# Patient Record
Sex: MALE | Race: WHITE | NOT HISPANIC OR LATINO | Employment: FULL TIME | URBAN - METROPOLITAN AREA
[De-identification: names, ages, dates, MRNs, and addresses within clinical notes are randomized per-mention and may not be internally consistent; named-entity substitution may affect disease eponyms.]

---

## 2017-01-31 ENCOUNTER — OFFICE VISIT (OUTPATIENT)
Dept: SURGERY | Facility: CLINIC | Age: 37
End: 2017-01-31
Payer: COMMERCIAL

## 2017-01-31 VITALS
DIASTOLIC BLOOD PRESSURE: 99 MMHG | SYSTOLIC BLOOD PRESSURE: 139 MMHG | WEIGHT: 220 LBS | BODY MASS INDEX: 29.16 KG/M2 | HEIGHT: 73 IN | HEART RATE: 70 BPM

## 2017-01-31 DIAGNOSIS — K40.90 RIGHT INGUINAL HERNIA: Primary | ICD-10-CM

## 2017-01-31 PROCEDURE — 99243 OFF/OP CNSLTJ NEW/EST LOW 30: CPT | Performed by: SURGERY

## 2017-01-31 NOTE — MR AVS SNAPSHOT
"              After Visit Summary   1/31/2017    Volodymyr Valles    MRN: [id number]           Patient Information     Date Of Birth          1980        Visit Information        Provider Department      1/31/2017 9:15 AM Africa Whittington MD [facility]       Follow-ups after your visit        Who to contact     If you have questions or need follow up information about today's clinic visit or your schedule please contact [facility] directly at [phone].  Normal or non-critical lab and imaging results will be communicated to you by [employer], letter or phone within 4 business days after the clinic has received the results. If you do not hear from us within 7 days, please contact the clinic through [employer] or phone. If you have a critical or abnormal lab result, we will notify you by phone as soon as possible.  Submit refill requests through [employer] or call your pharmacy and they will forward the refill request to us. Please allow 3 business days for your refill to be completed.          Additional Information About Your Visit        MyChart Information     [employer] gives you secure access to your electronic health record. If you see a primary care provider, you can also send messages to your care team and make appointments. If you have questions, please call your primary care clinic.  If you do not have a primary care provider, please call [phone] and they will assist you.        Care EveryWhere ID     This is your Care EveryWhere ID. This could be used by other organizations to access your [facility] medical records  [id number]        Your Vitals Were     Pulse Height BMI (Body Mass Index)             70 6' 1\" (1.854 m) 29.03 kg/m2          Blood Pressure from Last 3 Encounters:   01/31/17 139/99   11/11/16 122/86   06/30/15 130/75    Weight from Last 3 Encounters:   01/31/17 220 lb (99.791 kg)   11/11/16 234 lb (106.142 " kg)   06/30/15 236 lb 6.4 oz (107.23 kg)              Today, you had the following     No orders found for display       Primary Care Provider Office Phone # Fax #    Wu Clay [phone]       [facility] [address] [facility] [address]        Thank you!     Thank you for choosing [facility]  for your care. Our goal is always to provide you with excellent care. Hearing back from our patients is one way we can continue to improve our services. Please take a few minutes to complete the written survey that you may receive in the mail after your visit with us. Thank you!             Your Updated Medication List - Protect others around you: Learn how to safely use, store and throw away your medicines at www.disposemymeds.org.          This list is accurate as of: 1/31/17  9:35 AM.  Always use your most recent med list.                   Brand Name Dispense Instructions for use    acetaminophen 325 MG tablet    TYLENOL    100 tablet    Take 2 tablets (650 mg) by mouth every 6 hours as needed for mild pain or fever       Multi-vitamin Tabs tablet      Take 1 tablet by mouth daily

## 2017-01-31 NOTE — PROGRESS NOTES
General Surgery Clinic Consultation      HPI:  Volodymyr is a 36 year old male who presents for consultation referred by Dr. Clay who presents for evaluation of right groin bulge.  He first noticed it a few months ago.  He denies pain but occasionally has aching in the right groin. He has always been able to reduce the hernia when lying flat. He does report that occasionally it is more difficult to reduce. .  Negative for associated symptoms of nausea, vomiting, diarrhea and constipation.  He has not had a previous herniorrhaphy in this location. He had a prior left inguinal hernia repair 10-15 years ago. He has history of epidermoid tumor excision with craniotomy 2 years ago.     Constipation: No  Colonoscopy: No   Dysuria: No  Cough: No  Diabetes: No  Current smoker: Yes - e cigarrette  Employment does not require heavy lifting.    Past Medical History:   has a past medical history of WPW syndrome and Epidermoid cyst.    Past Surgical History:  Past Surgical History   Procedure Laterality Date     C ablation supravent arrhythmogenic focus  1993      repair sliding inguinal hernia  2003     left     Craniotomy, retrosigmoid  4/7/2014     Procedure: CRANIOTOMY, RETROSIGMOID;  Left Retrosigmoid Approach, Resection Of Tumor, Ventriculostomy, Left  hip Fat [place] ;  Surgeon: Shalom Kebede MD;  Location: [facility] OR     Ventriculostomy  4/7/2014     Procedure: VENTRICULOSTOMY;;  Surgeon: Shalom Kebede MD;  Location: [facility] OR     Procure graft fat  4/7/2014     Procedure: PROCURE GRAFT FAT;;  Surgeon: Shalom Kebede MD;  Location: [facility] OR      Additional abdominal operations: left inguinal hernia repair at [facility]    Social History:  Social History     Social History     Marital Status:      Spouse Name: N/A     Number of Children: 0     Years of Education: N/A     Occupational History     [employer]           Social History Main Topics     Smoking status: Former Smoker -- 0.30  packs/day     Types: Cigarettes     Quit date: 10/01/2012     Smokeless tobacco: Never Used      Comment: E-Cig     Alcohol Use: 6.0 - 7.5 oz/week     12-15 Cans of beer per week      Comment: Occasionally     Drug Use: No     Sexual Activity:     Partners: Female     Other Topics Concern     Not on file     Social History Narrative    , expecting twins in February 2014, works at [employer] in [employer] ([employer])        Family History:  Family History   Problem Relation Age of Onset     DIABETES Paternal Grandfather      Hernias: Yes - father had three hernia surgeries    ROS:  The 10 point review of systems is negative other than noted in the HPI and above.    PE:    General- Well-developed, well-nourished, patient able to get up on table without difficulty.  HEENT- Normocephalic and atraumatic. Pupils equal and round.  Mucous membranes moist.  Sclera are nonicteric.  Neck- No lymphadenopathy or masses   Respirations- are regular and non labored  Abdomen- abdomen is soft without significant tenderness, masses, organomegaly or guarding, small umbilical hernia present  Hernia- Left inguinal hernia is not present, well healed scar left groin              Moderate sized right inguinal hernia is present spontaneously, The hernia is spontaneously reducible              Testes are descended bilaterally and normal    Assessment: Primary, reducible right inguinal hernia.    Plan:    We have discussed observation, reduction techniques and importance, incarceration and strangulation signs, symptoms and importance as well as need to seek emergency treatment.      We have had a detailed discussion regarding the nature of hernias, surgery, indications, alternatives, risks, benefits, incisions, scarring, anesthesia, recovery, mesh, infection, bleeding, numbness, nerve damage and chronic pain, testicular loss, hernia recurrence, lifting and activity limitations after surgery.  All questions have been  answered to the best of my ability.     We will schedule surgery at the patient's convenience. We will plan for laparoscopic right inguinal hernia repair with mesh and open umbilical hernia repair.     Time spent with the patient with greater that 50% of the time in discussion was 40 minutes.     Africa Whittington MD    Please route or send letter to:  Primary Care Provider (PCP) and Referring Provider          HPI      ROS      Physical Exam

## 2017-01-31 NOTE — Clinical Note
2017    General Surgery Clinic Consultation    RE:  Volodymyr Valles-:  [birth year]    HPI:  Volodymyr is a 36 year old male who presents for consultation referred by Dr. Clay who presents for evaluation of right groin bulge.  He first noticed it a few months ago.  He denies pain but occasionally has aching in the right groin. He has always been able to reduce the hernia when lying flat. He does report that occasionally it is more difficult to reduce. .  Negative for associated symptoms of nausea, vomiting, diarrhea and constipation.  He has not had a previous herniorrhaphy in this location. He had a prior left inguinal hernia repair 10-15 years ago. He has history of epidermoid tumor excision with craniotomy 2 years ago.     Constipation: No  Colonoscopy: No   Dysuria: No  Cough: No  Diabetes: No  Current smoker: Yes - e cigarrette  Employment does not require heavy lifting.    Past Medical History:  Has a past medical history of WPW syndrome and Epidermoid cyst.    Hernias: Yes - father had three hernia surgeries    ROS:  The 10 point review of systems is negative other than noted in the HPI and above.    PE:    General- Well-developed, well-nourished, patient able to get up on table without difficulty.  HEENT- Normocephalic and atraumatic. Pupils equal and round.  Mucous membranes moist.  Sclera are nonicteric.  Neck- No lymphadenopathy or masses   Respirations- are regular and non labored  Abdomen- abdomen is soft without significant tenderness, masses, organomegaly or guarding, small umbilical hernia present  Hernia- Left inguinal hernia is not present, well healed scar left groin              Moderate sized right inguinal hernia is present spontaneously, The hernia is spontaneously reducible              Testes are descended bilaterally and normal    Assessment: Primary, reducible right inguinal hernia.    Plan:    We have discussed observation, reduction techniques and importance, incarceration  and strangulation signs, symptoms and importance as well as need to seek emergency treatment.      We have had a detailed discussion regarding the nature of hernias, surgery, indications, alternatives, risks, benefits, incisions, scarring, anesthesia, recovery, mesh, infection, bleeding, numbness, nerve damage and chronic pain, testicular loss, hernia recurrence, lifting and activity limitations after surgery.  All questions have been answered to the best of my ability.     We will schedule surgery at the patient's convenience. We will plan for laparoscopic right inguinal hernia repair with mesh and open umbilical hernia repair.         Africa Whittington MD

## 2017-03-07 ENCOUNTER — OFFICE VISIT (OUTPATIENT)
Dept: INTERNAL MEDICINE | Facility: CLINIC | Age: 37
End: 2017-03-07
Payer: COMMERCIAL

## 2017-03-07 VITALS
OXYGEN SATURATION: 100 % | DIASTOLIC BLOOD PRESSURE: 92 MMHG | BODY MASS INDEX: 30.51 KG/M2 | SYSTOLIC BLOOD PRESSURE: 142 MMHG | TEMPERATURE: 98.3 F | HEIGHT: 73 IN | HEART RATE: 81 BPM | WEIGHT: 230.2 LBS

## 2017-03-07 DIAGNOSIS — K40.90 RIGHT INGUINAL HERNIA: ICD-10-CM

## 2017-03-07 DIAGNOSIS — Z86.79 HISTORY OF WOLFF-PARKINSON-WHITE (WPW) SYNDROME: ICD-10-CM

## 2017-03-07 DIAGNOSIS — I45.6 WPW SYNDROME: ICD-10-CM

## 2017-03-07 DIAGNOSIS — Z01.818 PREOP GENERAL PHYSICAL EXAM: Primary | ICD-10-CM

## 2017-03-07 LAB — HGB BLD-MCNC: 15.6 G/DL (ref 13.3–17.7)

## 2017-03-07 PROCEDURE — 36415 COLL VENOUS BLD VENIPUNCTURE: CPT | Performed by: PHYSICIAN ASSISTANT

## 2017-03-07 PROCEDURE — 85018 HEMOGLOBIN: CPT | Performed by: PHYSICIAN ASSISTANT

## 2017-03-07 PROCEDURE — 99214 OFFICE O/P EST MOD 30 MIN: CPT | Performed by: PHYSICIAN ASSISTANT

## 2017-03-07 PROCEDURE — 93000 ELECTROCARDIOGRAM COMPLETE: CPT | Performed by: PHYSICIAN ASSISTANT

## 2017-03-07 NOTE — MR AVS SNAPSHOT
After Visit Summary   3/7/2017    Volodymyr Valles    MRN: [id number]           Patient Information     Date Of Birth          1980        Visit Information        Provider Department      3/7/2017 3:40 PM Lo Ramos PA-C [facility]        Today's Diagnoses     Preop general physical exam    -  1    Right inguinal hernia        History of Jeanette-Parkinson-White (WPW) syndrome        WPW syndrome          Care Instructions      Before Your Surgery      Call your surgeon if there is any change in your health. This includes signs of a cold or flu (such as a sore throat, runny nose, cough, rash or fever).    Do not smoke, drink alcohol or take over the counter medicine (unless your surgeon or primary care doctor tells you to) for the 24 hours before and after surgery.    If you take prescribed drugs: Follow your doctor s orders about which medicines to take and which to stop until after surgery.    Eating and drinking prior to surgery: follow the instructions from your surgeon    Take a shower or bath the night before surgery. Use the soap your surgeon gave you to gently clean your skin. If you do not have soap from your surgeon, use your regular soap. Do not shave or scrub the surgery site.  Wear clean pajamas and have clean sheets on your bed.         Follow-ups after your visit        Your next 10 appointments already scheduled     Mar 13, 2017  9:00 AM CDT   [facility] Same Day Surgery with Africa Whittington MD   [facility]    [facility])   [phone]            Mar 13, 2017   Procedure with Africa Whittington MD   [facility] (--)    [address]  [facility] [address] [phone]              Who to contact     If you have questions or need follow up information about today's clinic visit or your  "schedule please contact [facility] directly at [phone].  Normal or non-critical lab and imaging results will be communicated to you by MyChart, letter or phone within 4 business days after the clinic has received the results. If you do not hear from us within 7 days, please contact the clinic through [employer] or phone. If you have a critical or abnormal lab result, we will notify you by phone as soon as possible.  Submit refill requests through [employer] or call your pharmacy and they will forward the refill request to us. Please allow 3 business days for your refill to be completed.          Additional Information About Your Visit        [employer] Information     [employer] gives you secure access to your electronic health record. If you see a primary care provider, you can also send messages to your care team and make appointments. If you have questions, please call your primary care clinic.  If you do not have a primary care provider, please call [phone] and they will assist you.        Care EveryWhere ID     This is your Care EveryWhere ID. This could be used by other organizations to access your [facility] medical records  [id number]        Your Vitals Were     Pulse Temperature Height Pulse Oximetry BMI (Body Mass Index)       81 98.3  F (36.8  C) (Oral) 6' 1\" (1.854 m) 100% 30.37 kg/m2        Blood Pressure from Last 3 Encounters:   03/07/17 (!) 142/92   01/31/17 (!) 139/99   11/11/16 122/86    Weight from Last 3 Encounters:   03/07/17 230 lb 3.2 oz (104.4 kg)   01/31/17 220 lb (99.8 kg)   11/11/16 234 lb (106.1 kg)              We Performed the Following     EKG 12-lead complete w/read - Clinics     Hemoglobin        Primary Care Provider Office Phone # Fax #    Wu Clay [phone]       [facility] [address] [facility] [address]        Thank you!     Thank you for choosing [facility]  for your care. Our goal " is always to provide you with excellent care. Hearing back from our patients is one way we can continue to improve our services. Please take a few minutes to complete the written survey that you may receive in the mail after your visit with us. Thank you!             Your Updated Medication List - Protect others around you: Learn how to safely use, store and throw away your medicines at www.disposemymeds.org.          This list is accurate as of: 3/7/17  4:30 PM.  Always use your most recent med list.                   Brand Name Dispense Instructions for use    acetaminophen 325 MG tablet    TYLENOL    100 tablet    Take 2 tablets (650 mg) by mouth every 6 hours as needed for mild pain or fever       Multi-vitamin Tabs tablet      Take 1 tablet by mouth daily

## 2017-03-07 NOTE — NURSING NOTE
"Chief Complaint   Patient presents with     Pre-Op Exam     03/13-hernia        Initial BP (!) 142/92 (BP Location: Left arm, Patient Position: Chair, Cuff Size: Adult Regular)  Pulse 81  Temp 98.3  F (36.8  C) (Oral)  Ht 6' 1\" (1.854 m)  Wt 230 lb 3.2 oz (104.4 kg)  SpO2 100%  BMI 30.37 kg/m2 Estimated body mass index is 30.37 kg/(m^2) as calculated from the following:    Height as of this encounter: 6' 1\" (1.854 m).    Weight as of this encounter: 230 lb 3.2 oz (104.4 kg).  Medication Reconciliation: complete    "

## 2017-03-07 NOTE — PROGRESS NOTES
[address] [address]  [phone]  Dept: [phone]    PRE-OP EVALUATION:  Today's date: 3/7/2017    Volodymyr Valles (: 1980) presents for pre-operative evaluation assessment as requested by Dr. Whittington.  He requires evaluation and anesthesia risk assessment prior to undergoing surgery/procedure for treatment of inguinal hernia  .  Proposed procedure: LAPAROCOPIC RIGHT INGUINAL HERNIA POSSIBLE OPEN INGUINAL HERNIA REPAIR WITH MESH; OPEN UMBILLICAL HERNIA REPAIR WITH MESH .     Date of Surgery/ Procedure:   Time of Surgery/ Procedure: 7am  Hospital/Surgical [facility]    Primary Physician: Wu Clay  Type of Anesthesia Anticipated: general    Patient has a Health Care Directive or Living Will:  NO    1. NO - Do you have a history of heart attack, stroke, stent, bypass or surgery on an artery in the head, neck, heart or legs?  2. NO - Do you ever have any pain or discomfort in your chest?  3. NO - Do you have a history of  Heart Failure?  4. NO - Are you troubled by shortness of breath when: walking on the level, up a slight hill or at night?  5. NO - Do you currently have a cold, bronchitis or other respiratory infection?  6. NO - Do you have a cough, shortness of breath or wheezing?  7. NO - Do you sometimes get pains in the calves of your legs when you walk?  8. NO - Do you or anyone in your family have previous history of blood clots?  9. NO - Do you or does anyone in your family have a serious bleeding problem such as prolonged bleeding following surgeries or cuts?  10. NO - Have you ever had problems with anemia or been told to take iron pills?  11. NO - Have you had any abnormal blood loss such as black, tarry or bloody stools, or abnormal vaginal bleeding?  12. NO - Have you ever had a blood transfusion?  13. NO - Have you or any of your relatives ever had problems with anesthesia?  14. NO - Do you  have sleep apnea, excessive snoring or daytime drowsiness?  15. NO - Do you have any prosthetic heart valves?  16. NO - Do you have prosthetic joints?  17. NO - Is there any chance that you may be pregnant?      HPI:                                                      Brief HPI related to upcoming procedure: right inguinal hernia, and umbilical hernia       See problem list for active medical problems.  Problems all longstanding and stable, except as noted/documented.  See ROS for pertinent symptoms related to these conditions.                                                                                                  .    MEDICAL HISTORY:                                                      Patient Active Problem List    Diagnosis Date Noted     History of Jeanette-Parkinson-White (WPW) syndrome 03/07/2017     Priority: Medium     Epidermoid cyst 04/07/2014     WPW syndrome      recurrent SVT- s/p ablation       Arachnoid cyst      CARDIOVASCULAR SCREENING; LDL GOAL LESS THAN 160 10/31/2010      Past Medical History   Diagnosis Date     Epidermoid cyst      brain     WPW syndrome      recurrent SVT- s/p ablation     Past Surgical History   Procedure Laterality Date     C ablation supravent arrhythmogenic focus  1993     Hc repair sliding inguinal hernia  2003     left     Craniotomy, retrosigmoid  4/7/2014     Procedure: CRANIOTOMY, RETROSIGMOID;  Left Retrosigmoid Approach, Resection Of Tumor, Ventriculostomy, Left  hip Fat [place] ;  Surgeon: Shalom Kebede MD;  Location: [facility] OR     Ventriculostomy  4/7/2014     Procedure: VENTRICULOSTOMY;;  Surgeon: Shalom Kebede MD;  Location: [facility] OR     Procure graft fat  4/7/2014     Procedure: PROCURE GRAFT FAT;;  Surgeon: Shalom Kebede MD;  Location: [facility] OR     Current Outpatient Prescriptions   Medication Sig Dispense Refill     acetaminophen (TYLENOL) 325 MG tablet Take 2 tablets (650 mg) by mouth every 6 hours as needed for  "mild pain or fever 100 tablet      multivitamin, therapeutic with minerals (MULTI-VITAMIN) TABS Take 1 tablet by mouth daily       OTC products: no recent use of OTC ASA, NSAIDS or Steroids    No Known Allergies   Latex Allergy: NO    Social History   Substance Use Topics     Smoking status: Former Smoker     Packs/day: 0.30     Types: Cigarettes     Quit date: 10/1/2012     Smokeless tobacco: Never Used      Comment: E-Cig     Alcohol use 6.0 - 7.5 oz/week     12 - 15 Cans of beer per week      Comment: Occasionally     History   Drug Use No       REVIEW OF SYSTEMS:                                                    C: NEGATIVE for fever, chills, change in weight  INTEGUMENTARY/SKIN: NEGATIVE for worrisome rashes, moles or lesions  EYES: NEGATIVE for vision changes or irritation  E/M: NEGATIVE for ear, mouth and throat problems  R: NEGATIVE for significant cough or SOB  CV: NEGATIVE for chest pain, palpitations or peripheral edema  GI: NEGATIVE for nausea, abdominal pain, heartburn, or change in bowel habits  MUSCULOSKELETAL: NEGATIVE for significant arthralgias or myalgia  NEURO: NEGATIVE for weakness, dizziness or paresthesias  ENDOCRINE: NEGATIVE for temperature intolerance, skin/hair changes  HEME/ALLERGY/IMMUNE: NEGATIVE for bleeding problems  PSYCHIATRIC: NEGATIVE for changes in mood or affect  ROS otherwise negative    EXAM:                                                    BP (!) 142/92 (BP Location: Left arm, Patient Position: Chair, Cuff Size: Adult Regular)  Pulse 81  Temp 98.3  F (36.8  C) (Oral)  Ht 6' 1\" (1.854 m)  Wt 230 lb 3.2 oz (104.4 kg)  SpO2 100%  BMI 30.37 kg/m2  GENERAL APPEARANCE: healthy, alert and no distress  EYES: Eyes grossly normal to inspection, PERRL and conjunctivae and sclerae normal  HENT: ear canals and TM's normal and nose and mouth without ulcers or lesions  RESP: lungs clear to auscultation - no rales, rhonchi or wheezes  CV: regular rate and rhythm, normal S1 S2, no S3 or S4 " and no murmur, click or rub   ABDOMEN: soft, nontender, no HSM or masses and bowel sounds normal  MS: extremities normal- no gross deformities noted  SKIN: no suspicious lesions or rashes  NEURO: Normal strength and tone, sensory exam grossly normal, mentation intact and speech normal  PSYCH: mentation appears normal and affect normal/bright    DIAGNOSTICS:                                                      EKG: appears normal, NSR, normal axis, normal intervals, no acute ST/T changes c/w ischemia, no LVH by voltage criteria, unchanged from previous tracings  Labs Resulted Today:   Results for orders placed or performed in visit on 03/07/17   Hemoglobin   Result Value Ref Range    Hemoglobin 15.6 13.3 - 17.7 g/dL       Recent Labs   Lab Test  04/11/14   0435  04/10/14   0450  04/09/14   0343   04/08/14   0431   HGB  14.1  13.2*  13.1*   --   13.5   PLT  191  157  160   --   166   INR   --    --   1.08   --   1.09   NA  141  143  143   < >  145*   POTASSIUM  4.1  4.0  3.5   < >  3.6   CR  0.86  0.81  0.87   --   0.86    < > = values in this interval not displayed.        IMPRESSION:                                                    Reason for surgery/procedure: inguinal and umbilical hernia  Diagnosis/reason for consult: hx of WPW, epidermoid cyst arachnoid cyst- hx of surgery     The proposed surgical procedure is considered INTERMEDIATE risk.    REVISED CARDIAC RISK INDEX  The patient has the following serious cardiovascular risks for perioperative complications such as (MI, PE, VFib and 3  AV Block):  No serious cardiac risks  INTERPRETATION: 0 risks: Class I (very low risk - 0.4% complication rate)    The patient has the following additional risks for perioperative complications:  No identified additional risks      ICD-10-CM    1. Right inguinal hernia K40.90 EKG 12-lead complete w/read - Clinics   2. History of Jeanette-Parkinson-White (WPW) syndrome Z86.79 EKG 12-lead complete w/read - Clinics   3. WPW  syndrome I45.6 EKG 12-lead complete w/read - Clinics       RECOMMENDATIONS:                                                              APPROVAL GIVEN to proceed with proposed procedure, without further diagnostic evaluation       Signed Electronically by: Lo Ramos PA-C    Copy of this evaluation report is provided to requesting physician.    [place] Preop Guidelines

## 2017-03-08 NOTE — H&P (VIEW-ONLY)
[address] [address]  [phone]  Dept: [phone]    PRE-OP EVALUATION:  Today's date: 3/7/2017    Volodymyr Valles (: 1980) presents for pre-operative evaluation assessment as requested by Dr. Whittington.  He requires evaluation and anesthesia risk assessment prior to undergoing surgery/procedure for treatment of inguinal hernia  .  Proposed procedure: LAPAROCOPIC RIGHT INGUINAL HERNIA POSSIBLE OPEN INGUINAL HERNIA REPAIR WITH MESH; OPEN UMBILLICAL HERNIA REPAIR WITH MESH .     Date of Surgery/ Procedure:   Time of Surgery/ Procedure: 7am  Hospital/Surgical Facility: [facility]    Primary Physician: uW Clay  Type of Anesthesia Anticipated: general    Patient has a Health Care Directive or Living Will:  NO    1. NO - Do you have a history of heart attack, stroke, stent, bypass or surgery on an artery in the head, neck, heart or legs?  2. NO - Do you ever have any pain or discomfort in your chest?  3. NO - Do you have a history of  Heart Failure?  4. NO - Are you troubled by shortness of breath when: walking on the level, up a slight hill or at night?  5. NO - Do you currently have a cold, bronchitis or other respiratory infection?  6. NO - Do you have a cough, shortness of breath or wheezing?  7. NO - Do you sometimes get pains in the calves of your legs when you walk?  8. NO - Do you or anyone in your family have previous history of blood clots?  9. NO - Do you or does anyone in your family have a serious bleeding problem such as prolonged bleeding following surgeries or cuts?  10. NO - Have you ever had problems with anemia or been told to take iron pills?  11. NO - Have you had any abnormal blood loss such as black, tarry or bloody stools, or abnormal vaginal bleeding?  12. NO - Have you ever had a blood transfusion?  13. NO - Have you or any of your relatives ever had problems with anesthesia?  14. NO - Do you  have sleep apnea, excessive snoring or daytime drowsiness?  15. NO - Do you have any prosthetic heart valves?  16. NO - Do you have prosthetic joints?  17. NO - Is there any chance that you may be pregnant?      HPI:                                                      Brief HPI related to upcoming procedure: right inguinal hernia, and umbilical hernia       See problem list for active medical problems.  Problems all longstanding and stable, except as noted/documented.  See ROS for pertinent symptoms related to these conditions.                                                                                                  .    MEDICAL HISTORY:                                                      Patient Active Problem List    Diagnosis Date Noted     History of Jeanette-Parkinson-White (WPW) syndrome 03/07/2017     Priority: Medium     Epidermoid cyst 04/07/2014     WPW syndrome      recurrent SVT- s/p ablation       Arachnoid cyst      CARDIOVASCULAR SCREENING; LDL GOAL LESS THAN 160 10/31/2010      Past Medical History   Diagnosis Date     Epidermoid cyst      brain     WPW syndrome      recurrent SVT- s/p ablation     Past Surgical History   Procedure Laterality Date     C ablation supravent arrhythmogenic focus  1993     Hc repair sliding inguinal hernia  2003     left     Craniotomy, retrosigmoid  4/7/2014     Procedure: CRANIOTOMY, RETROSIGMOID;  Left Retrosigmoid Approach, Resection Of Tumor, Ventriculostomy, Left  hip Fat [place] ;  Surgeon: Shalom Kebede MD;  Location: [facility] OR     Ventriculostomy  4/7/2014     Procedure: VENTRICULOSTOMY;;  Surgeon: Shalom Kebede MD;  Location: [facility] OR     Procure graft fat  4/7/2014     Procedure: PROCURE GRAFT FAT;;  Surgeon: Shalom Kebede MD;  Location: [facility] OR     Current Outpatient Prescriptions   Medication Sig Dispense Refill     acetaminophen (TYLENOL) 325 MG tablet Take 2 tablets (650 mg) by mouth every 6 hours as needed for  "mild pain or fever 100 tablet      multivitamin, therapeutic with minerals (MULTI-VITAMIN) TABS Take 1 tablet by mouth daily       OTC products: no recent use of OTC ASA, NSAIDS or Steroids    No Known Allergies   Latex Allergy: NO    Social History   Substance Use Topics     Smoking status: Former Smoker     Packs/day: 0.30     Types: Cigarettes     Quit date: 10/1/2012     Smokeless tobacco: Never Used      Comment: E-Cig     Alcohol use 6.0 - 7.5 oz/week     12 - 15 Cans of beer per week      Comment: Occasionally     History   Drug Use No       REVIEW OF SYSTEMS:                                                    C: NEGATIVE for fever, chills, change in weight  INTEGUMENTARY/SKIN: NEGATIVE for worrisome rashes, moles or lesions  EYES: NEGATIVE for vision changes or irritation  E/M: NEGATIVE for ear, mouth and throat problems  R: NEGATIVE for significant cough or SOB  CV: NEGATIVE for chest pain, palpitations or peripheral edema  GI: NEGATIVE for nausea, abdominal pain, heartburn, or change in bowel habits  MUSCULOSKELETAL: NEGATIVE for significant arthralgias or myalgia  NEURO: NEGATIVE for weakness, dizziness or paresthesias  ENDOCRINE: NEGATIVE for temperature intolerance, skin/hair changes  HEME/ALLERGY/IMMUNE: NEGATIVE for bleeding problems  PSYCHIATRIC: NEGATIVE for changes in mood or affect  ROS otherwise negative    EXAM:                                                    BP (!) 142/92 (BP Location: Left arm, Patient Position: Chair, Cuff Size: Adult Regular)  Pulse 81  Temp 98.3  F (36.8  C) (Oral)  Ht 6' 1\" (1.854 m)  Wt 230 lb 3.2 oz (104.4 kg)  SpO2 100%  BMI 30.37 kg/m2  GENERAL APPEARANCE: healthy, alert and no distress  EYES: Eyes grossly normal to inspection, PERRL and conjunctivae and sclerae normal  HENT: ear canals and TM's normal and nose and mouth without ulcers or lesions  RESP: lungs clear to auscultation - no rales, rhonchi or wheezes  CV: regular rate and rhythm, normal S1 S2, no S3 or S4 " and no murmur, click or rub   ABDOMEN: soft, nontender, no HSM or masses and bowel sounds normal  MS: extremities normal- no gross deformities noted  SKIN: no suspicious lesions or rashes  NEURO: Normal strength and tone, sensory exam grossly normal, mentation intact and speech normal  PSYCH: mentation appears normal and affect normal/bright    DIAGNOSTICS:                                                      EKG: appears normal, NSR, normal axis, normal intervals, no acute ST/T changes c/w ischemia, no LVH by voltage criteria, unchanged from previous tracings  Labs Resulted Today:   Results for orders placed or performed in visit on 03/07/17   Hemoglobin   Result Value Ref Range    Hemoglobin 15.6 13.3 - 17.7 g/dL       Recent Labs   Lab Test  04/11/14   0435  04/10/14   0450  04/09/14   0343   04/08/14   0431   HGB  14.1  13.2*  13.1*   --   13.5   PLT  191  157  160   --   166   INR   --    --   1.08   --   1.09   NA  141  143  143   < >  145*   POTASSIUM  4.1  4.0  3.5   < >  3.6   CR  0.86  0.81  0.87   --   0.86    < > = values in this interval not displayed.        IMPRESSION:                                                    Reason for surgery/procedure: inguinal and umbilical hernia  Diagnosis/reason for consult: hx of WPW, epidermoid cyst arachnoid cyst- hx of surgery     The proposed surgical procedure is considered INTERMEDIATE risk.    REVISED CARDIAC RISK INDEX  The patient has the following serious cardiovascular risks for perioperative complications such as (MI, PE, VFib and 3  AV Block):  No serious cardiac risks  INTERPRETATION: 0 risks: Class I (very low risk - 0.4% complication rate)    The patient has the following additional risks for perioperative complications:  No identified additional risks      ICD-10-CM    1. Right inguinal hernia K40.90 EKG 12-lead complete w/read - Clinics   2. History of Jeanette-Parkinson-White (WPW) syndrome Z86.79 EKG 12-lead complete w/read - Clinics   3. WPW  syndrome I45.6 EKG 12-lead complete w/read - Clinics       RECOMMENDATIONS:                                                              APPROVAL GIVEN to proceed with proposed procedure, without further diagnostic evaluation       Signed Electronically by: Lo Ramos PA-C    Copy of this evaluation report is provided to requesting physician.    [place] Preop Guidelines

## 2017-03-13 ENCOUNTER — APPOINTMENT (OUTPATIENT)
Dept: SURGERY | Facility: PHYSICIAN GROUP | Age: 37
End: 2017-03-13
Payer: COMMERCIAL

## 2017-03-13 ENCOUNTER — SURGERY (OUTPATIENT)
Age: 37
End: 2017-03-13

## 2017-03-13 ENCOUNTER — HOSPITAL ENCOUNTER (OUTPATIENT)
Facility: CLINIC | Age: 37
Discharge: HOME | End: 2017-03-13
Attending: SURGERY | Admitting: SURGERY
Payer: COMMERCIAL

## 2017-03-13 ENCOUNTER — ANESTHESIA EVENT (OUTPATIENT)
Dept: SURGERY | Facility: CLINIC | Age: 37
End: 2017-03-13
Payer: COMMERCIAL

## 2017-03-13 ENCOUNTER — ANESTHESIA (OUTPATIENT)
Dept: SURGERY | Facility: CLINIC | Age: 37
End: 2017-03-13
Payer: COMMERCIAL

## 2017-03-13 VITALS
BODY MASS INDEX: 29.69 KG/M2 | DIASTOLIC BLOOD PRESSURE: 85 MMHG | TEMPERATURE: 97.9 F | HEART RATE: 85 BPM | RESPIRATION RATE: 18 BRPM | SYSTOLIC BLOOD PRESSURE: 125 MMHG | WEIGHT: 225 LBS | OXYGEN SATURATION: 98 %

## 2017-03-13 DIAGNOSIS — G89.18 ACUTE POST-OPERATIVE PAIN: Primary | ICD-10-CM

## 2017-03-13 PROCEDURE — 27210995 ZZH RX 272: Performed by: SURGERY

## 2017-03-13 PROCEDURE — 37000009 ZZH ANESTHESIA TECHNICAL FEE, EACH ADDTL 15 MIN: Performed by: SURGERY

## 2017-03-13 PROCEDURE — 49585 ZZHC REPAIR UMBILICAL HERN,5+Y/O,REDUC: CPT | Mod: AS | Performed by: PHYSICIAN ASSISTANT

## 2017-03-13 PROCEDURE — 25000132 ZZH RX MED GY IP 250 OP 250 PS 637: Performed by: PHYSICIAN ASSISTANT

## 2017-03-13 PROCEDURE — 49585 ZZHC REPAIR UMBILICAL HERN,5+Y/O,REDUC: CPT | Performed by: SURGERY

## 2017-03-13 PROCEDURE — 71000027 ZZH RECOVERY PHASE 2 EACH 15 MINS: Performed by: SURGERY

## 2017-03-13 PROCEDURE — 25000125 ZZHC RX 250: Performed by: SURGERY

## 2017-03-13 PROCEDURE — 25000566 ZZH SEVOFLURANE, EA 15 MIN: Performed by: SURGERY

## 2017-03-13 PROCEDURE — 49650 LAP ING HERNIA REPAIR INIT: CPT | Performed by: SURGERY

## 2017-03-13 PROCEDURE — 25000128 H RX IP 250 OP 636

## 2017-03-13 PROCEDURE — 36000056 ZZH SURGERY LEVEL 3 1ST 30 MIN: Performed by: SURGERY

## 2017-03-13 PROCEDURE — 25800025 ZZH RX 258

## 2017-03-13 PROCEDURE — C1781 MESH (IMPLANTABLE): HCPCS | Performed by: SURGERY

## 2017-03-13 PROCEDURE — 25000128 H RX IP 250 OP 636: Performed by: SURGERY

## 2017-03-13 PROCEDURE — 25000125 ZZHC RX 250

## 2017-03-13 PROCEDURE — 71000012 ZZH RECOVERY PHASE 1 LEVEL 1 FIRST HR: Performed by: SURGERY

## 2017-03-13 PROCEDURE — 71000013 ZZH RECOVERY PHASE 1 LEVEL 1 EA ADDTL HR: Performed by: SURGERY

## 2017-03-13 PROCEDURE — 37000008 ZZH ANESTHESIA TECHNICAL FEE, 1ST 30 MIN: Performed by: SURGERY

## 2017-03-13 PROCEDURE — S0020 INJECTION, BUPIVICAINE HYDRO: HCPCS | Performed by: SURGERY

## 2017-03-13 PROCEDURE — 36000058 ZZH SURGERY LEVEL 3 EA 15 ADDTL MIN: Performed by: SURGERY

## 2017-03-13 PROCEDURE — 49650 LAP ING HERNIA REPAIR INIT: CPT | Mod: AS | Performed by: PHYSICIAN ASSISTANT

## 2017-03-13 PROCEDURE — 27210794 ZZH OR GENERAL SUPPLY STERILE: Performed by: SURGERY

## 2017-03-13 PROCEDURE — 25000125 ZZHC RX 250: Performed by: ANESTHESIOLOGY

## 2017-03-13 PROCEDURE — 40000170 ZZH STATISTIC PRE-PROCEDURE ASSESSMENT II: Performed by: SURGERY

## 2017-03-13 DEVICE — MESH PROGRIP LAPAROSCOPIC 5.9X3.9" PARIETEX SELF-FIX LPG1510: Type: IMPLANTABLE DEVICE | Site: INGUINAL | Status: FUNCTIONAL

## 2017-03-13 DEVICE — MESH COMPOSITE W/COLLAGEN 4CM OPEN VENTRAL PARIETEX PCO4VP: Type: IMPLANTABLE DEVICE | Site: UMBILICAL | Status: FUNCTIONAL

## 2017-03-13 RX ORDER — CEFAZOLIN SODIUM 2 G/100ML
2 INJECTION, SOLUTION INTRAVENOUS
Status: COMPLETED | OUTPATIENT
Start: 2017-03-13 | End: 2017-03-13

## 2017-03-13 RX ORDER — OXYCODONE AND ACETAMINOPHEN 5; 325 MG/1; MG/1
1-2 TABLET ORAL
Status: COMPLETED | OUTPATIENT
Start: 2017-03-13 | End: 2017-03-13

## 2017-03-13 RX ORDER — MAGNESIUM HYDROXIDE 1200 MG/15ML
LIQUID ORAL PRN
Status: DISCONTINUED | OUTPATIENT
Start: 2017-03-13 | End: 2017-03-13 | Stop reason: HOSPADM

## 2017-03-13 RX ORDER — FENTANYL CITRATE 50 UG/ML
INJECTION, SOLUTION INTRAMUSCULAR; INTRAVENOUS PRN
Status: DISCONTINUED | OUTPATIENT
Start: 2017-03-13 | End: 2017-03-13

## 2017-03-13 RX ORDER — PROPOFOL 10 MG/ML
INJECTION, EMULSION INTRAVENOUS PRN
Status: DISCONTINUED | OUTPATIENT
Start: 2017-03-13 | End: 2017-03-13

## 2017-03-13 RX ORDER — ONDANSETRON 4 MG/1
4 TABLET, ORALLY DISINTEGRATING ORAL EVERY 30 MIN PRN
Status: DISCONTINUED | OUTPATIENT
Start: 2017-03-13 | End: 2017-03-13 | Stop reason: HOSPADM

## 2017-03-13 RX ORDER — NEOSTIGMINE METHYLSULFATE 1 MG/ML
VIAL (ML) INJECTION PRN
Status: DISCONTINUED | OUTPATIENT
Start: 2017-03-13 | End: 2017-03-13

## 2017-03-13 RX ORDER — NALOXONE HYDROCHLORIDE 0.4 MG/ML
.1-.4 INJECTION, SOLUTION INTRAMUSCULAR; INTRAVENOUS; SUBCUTANEOUS
Status: DISCONTINUED | OUTPATIENT
Start: 2017-03-13 | End: 2017-03-13 | Stop reason: HOSPADM

## 2017-03-13 RX ORDER — FENTANYL CITRATE 50 UG/ML
25-50 INJECTION, SOLUTION INTRAMUSCULAR; INTRAVENOUS
Status: DISCONTINUED | OUTPATIENT
Start: 2017-03-13 | End: 2017-03-13 | Stop reason: HOSPADM

## 2017-03-13 RX ORDER — MEPERIDINE HYDROCHLORIDE 25 MG/ML
12.5 INJECTION INTRAMUSCULAR; INTRAVENOUS; SUBCUTANEOUS
Status: DISCONTINUED | OUTPATIENT
Start: 2017-03-13 | End: 2017-03-13 | Stop reason: HOSPADM

## 2017-03-13 RX ORDER — SODIUM CHLORIDE, SODIUM LACTATE, POTASSIUM CHLORIDE, CALCIUM CHLORIDE 600; 310; 30; 20 MG/100ML; MG/100ML; MG/100ML; MG/100ML
INJECTION, SOLUTION INTRAVENOUS CONTINUOUS PRN
Status: DISCONTINUED | OUTPATIENT
Start: 2017-03-13 | End: 2017-03-13

## 2017-03-13 RX ORDER — OXYCODONE AND ACETAMINOPHEN 5; 325 MG/1; MG/1
1-2 TABLET ORAL EVERY 4 HOURS PRN
Qty: 30 TABLET | Refills: 0 | Status: SHIPPED | OUTPATIENT
Start: 2017-03-13 | End: 2018-01-30

## 2017-03-13 RX ORDER — BUPIVACAINE HYDROCHLORIDE 5 MG/ML
INJECTION, SOLUTION PERINEURAL PRN
Status: DISCONTINUED | OUTPATIENT
Start: 2017-03-13 | End: 2017-03-13 | Stop reason: HOSPADM

## 2017-03-13 RX ORDER — BUPIVACAINE HYDROCHLORIDE 5 MG/ML
INJECTION, SOLUTION EPIDURAL; INTRACAUDAL
Status: DISCONTINUED
Start: 2017-03-13 | End: 2017-03-13 | Stop reason: HOSPADM

## 2017-03-13 RX ORDER — AMOXICILLIN 250 MG
1-2 CAPSULE ORAL 2 TIMES DAILY
Qty: 30 TABLET | Refills: 0 | Status: SHIPPED | OUTPATIENT
Start: 2017-03-13 | End: 2018-01-30

## 2017-03-13 RX ORDER — CEFAZOLIN SODIUM 1 G/3ML
1 INJECTION, POWDER, FOR SOLUTION INTRAMUSCULAR; INTRAVENOUS SEE ADMIN INSTRUCTIONS
Status: DISCONTINUED | OUTPATIENT
Start: 2017-03-13 | End: 2017-03-13 | Stop reason: HOSPADM

## 2017-03-13 RX ORDER — ONDANSETRON 2 MG/ML
INJECTION INTRAMUSCULAR; INTRAVENOUS PRN
Status: DISCONTINUED | OUTPATIENT
Start: 2017-03-13 | End: 2017-03-13

## 2017-03-13 RX ORDER — SODIUM CHLORIDE, SODIUM LACTATE, POTASSIUM CHLORIDE, CALCIUM CHLORIDE 600; 310; 30; 20 MG/100ML; MG/100ML; MG/100ML; MG/100ML
INJECTION, SOLUTION INTRAVENOUS CONTINUOUS
Status: DISCONTINUED | OUTPATIENT
Start: 2017-03-13 | End: 2017-03-13 | Stop reason: HOSPADM

## 2017-03-13 RX ORDER — BUPIVACAINE HYDROCHLORIDE AND EPINEPHRINE 5; 5 MG/ML; UG/ML
INJECTION, SOLUTION EPIDURAL; INTRACAUDAL; PERINEURAL
Status: DISCONTINUED
Start: 2017-03-13 | End: 2017-03-13 | Stop reason: HOSPADM

## 2017-03-13 RX ORDER — DEXAMETHASONE SODIUM PHOSPHATE 4 MG/ML
INJECTION, SOLUTION INTRA-ARTICULAR; INTRALESIONAL; INTRAMUSCULAR; INTRAVENOUS; SOFT TISSUE PRN
Status: DISCONTINUED | OUTPATIENT
Start: 2017-03-13 | End: 2017-03-13

## 2017-03-13 RX ORDER — ONDANSETRON 2 MG/ML
4 INJECTION INTRAMUSCULAR; INTRAVENOUS EVERY 30 MIN PRN
Status: DISCONTINUED | OUTPATIENT
Start: 2017-03-13 | End: 2017-03-13 | Stop reason: HOSPADM

## 2017-03-13 RX ORDER — GLYCOPYRROLATE 0.2 MG/ML
INJECTION, SOLUTION INTRAMUSCULAR; INTRAVENOUS PRN
Status: DISCONTINUED | OUTPATIENT
Start: 2017-03-13 | End: 2017-03-13

## 2017-03-13 RX ORDER — LIDOCAINE HYDROCHLORIDE 20 MG/ML
INJECTION, SOLUTION INFILTRATION; PERINEURAL PRN
Status: DISCONTINUED | OUTPATIENT
Start: 2017-03-13 | End: 2017-03-13

## 2017-03-13 RX ORDER — VECURONIUM BROMIDE 1 MG/ML
INJECTION, POWDER, LYOPHILIZED, FOR SOLUTION INTRAVENOUS PRN
Status: DISCONTINUED | OUTPATIENT
Start: 2017-03-13 | End: 2017-03-13

## 2017-03-13 RX ADMIN — CEFAZOLIN SODIUM 2 G: 2 INJECTION, SOLUTION INTRAVENOUS at 09:18

## 2017-03-13 RX ADMIN — ROCURONIUM BROMIDE 40 MG: 10 INJECTION INTRAVENOUS at 09:11

## 2017-03-13 RX ADMIN — GLYCOPYRROLATE 0.8 MG: 0.2 INJECTION, SOLUTION INTRAMUSCULAR; INTRAVENOUS at 10:38

## 2017-03-13 RX ADMIN — OXYCODONE HYDROCHLORIDE AND ACETAMINOPHEN 1 TABLET: 5; 325 TABLET ORAL at 11:50

## 2017-03-13 RX ADMIN — NEOSTIGMINE METHYLSULFATE 5 MG: 1 INJECTION INTRAMUSCULAR; INTRAVENOUS; SUBCUTANEOUS at 10:38

## 2017-03-13 RX ADMIN — FENTANYL CITRATE 100 MCG: 50 INJECTION, SOLUTION INTRAMUSCULAR; INTRAVENOUS at 09:13

## 2017-03-13 RX ADMIN — SODIUM CHLORIDE, POTASSIUM CHLORIDE, SODIUM LACTATE AND CALCIUM CHLORIDE: 600; 310; 30; 20 INJECTION, SOLUTION INTRAVENOUS at 10:06

## 2017-03-13 RX ADMIN — BUPIVACAINE HYDROCHLORIDE 30 ML: 5 INJECTION, SOLUTION PERINEURAL at 10:35

## 2017-03-13 RX ADMIN — VECURONIUM BROMIDE 1 MG: 1 INJECTION, POWDER, LYOPHILIZED, FOR SOLUTION INTRAVENOUS at 10:21

## 2017-03-13 RX ADMIN — VECURONIUM BROMIDE 2 MG: 1 INJECTION, POWDER, LYOPHILIZED, FOR SOLUTION INTRAVENOUS at 09:49

## 2017-03-13 RX ADMIN — LIDOCAINE HYDROCHLORIDE 100 MG: 20 INJECTION, SOLUTION INFILTRATION; PERINEURAL at 09:11

## 2017-03-13 RX ADMIN — SODIUM CHLORIDE 1000 ML: 0.9 IRRIGANT IRRIGATION at 10:35

## 2017-03-13 RX ADMIN — ONDANSETRON 4 MG: 2 INJECTION INTRAMUSCULAR; INTRAVENOUS at 10:31

## 2017-03-13 RX ADMIN — FENTANYL CITRATE 50 MCG: 50 INJECTION, SOLUTION INTRAMUSCULAR; INTRAVENOUS at 11:16

## 2017-03-13 RX ADMIN — DEXAMETHASONE SODIUM PHOSPHATE 4 MG: 4 INJECTION, SOLUTION INTRAMUSCULAR; INTRAVENOUS at 09:19

## 2017-03-13 RX ADMIN — FENTANYL CITRATE 100 MCG: 50 INJECTION, SOLUTION INTRAMUSCULAR; INTRAVENOUS at 09:25

## 2017-03-13 RX ADMIN — PROPOFOL 200 MG: 10 INJECTION, EMULSION INTRAVENOUS at 09:11

## 2017-03-13 RX ADMIN — FENTANYL CITRATE 50 MCG: 50 INJECTION, SOLUTION INTRAMUSCULAR; INTRAVENOUS at 09:11

## 2017-03-13 RX ADMIN — MIDAZOLAM HYDROCHLORIDE 2 MG: 1 INJECTION, SOLUTION INTRAMUSCULAR; INTRAVENOUS at 09:11

## 2017-03-13 RX ADMIN — ROCURONIUM BROMIDE 10 MG: 10 INJECTION INTRAVENOUS at 09:28

## 2017-03-13 RX ADMIN — SODIUM CHLORIDE, POTASSIUM CHLORIDE, SODIUM LACTATE AND CALCIUM CHLORIDE: 600; 310; 30; 20 INJECTION, SOLUTION INTRAVENOUS at 09:10

## 2017-03-13 ASSESSMENT — ENCOUNTER SYMPTOMS
SEIZURES: 0
ORTHOPNEA: 0
DYSRHYTHMIAS: 1

## 2017-03-13 ASSESSMENT — LIFESTYLE VARIABLES: TOBACCO_USE: 1

## 2017-03-13 NOTE — OP NOTE
General Surgery Operative Note    PREOPERATIVE DIAGNOSIS:  Right inguinal hernia, umbilical hernia    POSTOPERATIVE DIAGNOSIS:  same    PROCEDURE:    1. Laparoscopic right inguinal hernia repair with mesh.   2. Open umbilical hernia repair with mesh    ANESTHESIA:  General.    SURGEON:  Africa Whittington MD    ASSISTANT:  Tasha Easton PA-C  A first assistant was necessary owing to challenging laparoscopic visualization and exposure.  Retraction was also necessary.    ESTIMATED BLOOD LOSS:  10ml    INTRAOPERATIVE FINDINGS:  Moderate sized right indirect inguinal hernia, small umbilical hernia    OPERATIVE INDICATIONS: Mr. Valles has a symptomatic Right inguinal hernia as well as an umbilical hernia. Options were discussed and it was elected to proceed with laparoscopic repair. Potential risks of bleeding, infection, neurovascular injury to the vas deferens or testicle, recurrent hernia, chronic pain were all reviewed in detail and he wished to proceed.     DESCRIPTION OF PROCEDURE: The patient was taken to the operating suite and uneventfully endotracheally intubated. The abdomen and groin were prepped and draped in the usual sterile fashion. Surgeon initiated timeout was performed verifying the correct patient, procedure, site, and surgeon. All in the room were in agreement. A mixture of lidocaine and marcaine was injected in the infra umbilical area.    A curvilinear incision was made at the underside of the umbilicus. Bovie cautery was used to get through the subcutaneous tissue. The anterior rectus sheath was encountered and sharply incised. The rectus muscle was retracted laterally and the dissecting balloon was passed along the posterior rectus sheath toward the pubic bone. The balloon was filled with air with the hand pump under direct visualization. The preperitoneal space was dissected nicely and the baloon held in place for 30 seconds for hemostasis. The dissecting balloon was then removed and our working  balloon was placed and positioned. Local was injected and Two 5 mm trocars were then placed along the lower midline under direct laparoscopic visualization. We began our dissection on the Right. side. Using combination of sharp and blunt dissection, a plane was created behind the abdominal wall and the underlying peritoneum. This was done in a blunt and atraumatic fashion. The inferior epigastric vessels were visualized running along the underside of the abdominal wall.  The epigastric vessels were followed down until we were able to identify the internal ring. The spermatic cord was then meticulously dissected preserving all of the nerve and blood vessels. A  moderate Indirect hernia was found and reduced without difficulty. Coopers ligament was then cleared without significant bleeding. The dissection was continued until we had an excellent space in the preperitoneal area. A piece of Progrip mesh was then placed within this space. The mesh was held in excellent position under direct visualization until the insufflation was completely out of the preperitoneal space.   All trocars were removed under direct visualization. The anterior fascia was closed with an 0 Vicryl in the figure of 8 fashion.   The umbilical skin was then mobilized from the underlying hernia sac. The hernia sac was dissected down to the level of the fascial margins. The margins of the fascia were thoroughly dissected and the hernia sac and its contents were reduced. Additional preperitoneal dissection was performed around the margins of the defect. Parietex 4.6cm circular mesh was then introduced. This was deployed through the defect. This laid out flat in the preperitoneal space. The tails of the mesh were cut and the fascia was closed overlying the mesh, incorporating mesh fixation to the cut tails using interrupted 0 Vicryl suture.  The umbilicus was tacked down to the fascia using a 3-0 vicryl suture.    Marcaine was once again injected to  the carlos-wound area. The incisions were completely dry without any bleeding. The skin was closed with a 4.0 Monocryl in a subcuticular fashion. Steri-Strips were applied. All Needle and sponge counts were correct.  A debriefing was performed verifying the correct procedure, sponge/instrument count, specimen indetification, and blood loss. The patient tolerated the procedure well and was taken to the recovery room in stable condition.    Africa Whittington MD

## 2017-03-13 NOTE — BRIEF OP NOTE
General Surgery Brief Operative Note    Pre-operative diagnosis: Right inguinal hernia; umbillical hernia    Post-operative diagnosis Same   Procedure: Procedure(s):  LAPAROCOPIC RIGHT INGUINAL HERNIA REPAIR WITH MESH; OPEN UMBILICAL HERNIA REPAIR WITH MESH .  - Wound Class: I-Clean   - Wound Class: I-Clean    Surgeon(s), Assistant(s): Surgeon(s) and Role:     * Africa Whittington MD - Primary     * Tasha Arreola PA-C - Assisting   Estimated blood loss: 10 mL   Drains: None   Specimens: * No specimens in log *   Findings: See postop diagnosis   Condition: Stable   Comments:      Tasha Arreola PA-C See dictated operative report for full details

## 2017-03-13 NOTE — ANESTHESIA POSTPROCEDURE EVALUATION
Patient: Volodymyr Valles    Procedure(s):  LAPAROCOPIC RIGHT INGUINAL HERNIA REPAIR WITH MESH; OPEN UMBILICAL HERNIA REPAIR WITH MESH .  - Wound Class: I-Clean   - Wound Class: I-Clean    Diagnosis:right inguinal hernia ; umbillical hernia   Diagnosis Additional Information: No value filed.    Anesthesia Type:  General, ETT    Note:  Anesthesia Post Evaluation    Patient location during evaluation: PACU  Patient participation: Able to fully participate in evaluation  Level of consciousness: awake  Pain management: adequate  Airway patency: patent  Cardiovascular status: acceptable  Respiratory status: acceptable  Hydration status: acceptable  PONV: none     Anesthetic complications: None          Last vitals:  Vitals:    03/13/17 1145 03/13/17 1200 03/13/17 1220   BP: 125/82 116/82 125/85   Pulse:   85   Resp: 13 18 18   Temp: 36.6  C (97.9  F)     SpO2: 92% 92% 98%         Electronically Signed By: Omar Cohen MD  March 13, 2017  2:55 PM

## 2017-03-13 NOTE — IP AVS SNAPSHOT
MRN:[id number]                      After Visit Summary   3/13/2017    Volodymyr Valles    MRN: [id number]           Thank you!     Thank you for choosing [facility] for your care. Our goal is always to provide you with excellent care. Hearing back from our patients is one way we can continue to improve our services. Please take a few minutes to complete the written survey that you may receive in the mail after you visit with us. Thank you!        Patient Information     Date Of Birth          1980        About your hospital stay     You were admitted on:  March 13, 2017 You last received care in the:  [facility] Same Day Surgery    You were discharged on:  March 13, 2017       Who to Call     For medical emergencies, please call 911.  For non-urgent questions about your medical care, please call your primary care provider or clinic, [phone]  For questions related to your surgery, please call your surgery clinic        Attending Provider     Provider Africa Carter MD Surgery       Primary Care Provider Office Phone # Fax #    Wu Clay [phone]       [facility] [address] [place] [phone]        Further instructions from your care team       **If you have concerns or questions about your procedure,    please contact Dr Whittington at  [phone]**    Same Day Surgery Discharge Instructions for  Sedation and General Anesthesia       It's not unusual to feel dizzy, light-headed or faint for up to 24 hours after surgery or while taking pain medication.  If you have these symptoms: sit for a few minutes before standing and have someone assist you when you get up to walk or use the bathroom.      You should rest and relax for the next 24 hours. We recommend you make arrangements to have an adult stay with you for at least 24 hours after your discharge.  Avoid hazardous and strenuous activity.      DO NOT DRIVE  any vehicle or operate mechanical equipment for 24 hours following the end of your surgery.  Even though you may feel normal, your reactions may be affected by the medication you have received.      Do not drink alcoholic beverages for 24 hours following surgery.       Slowly progress to your regular diet as you feel able. It's not unusual to feel nauseated and/or vomit after receiving anesthesia.  If you develop these symptoms, drink clear liquids (apple juice, ginger ale, broth, 7-up, etc. ) until you feel better.  If your nausea and vomiting persists for 24 hours, please notify your surgeon.        All narcotic pain medications, along with inactivity and anesthesia, can cause constipation. Drinking plenty of liquids and increasing fiber intake will help.      For any questions of a medical nature, call your surgeon.      Do not make important decisions for 24 hours.      If you had general anesthesia, you may have a sore throat for a couple of days related to the breathing tube used during surgery.  You may use Cepacol lozenges to help with this discomfort.  If it worsens or if you develop a fever, contact your surgeon.       If you feel your pain is not well managed with the pain medications prescribed by your surgeon, please contact your surgeon's office to let them know so they can address your concerns.           [facility]  Discharge Instructions: Post-Operative Hernia    DIET    Return to the diet you were on before surgery.    Suggest plenty of liquids and high fiber foods to keep stools soft.      May take 1 oz. (2 tablespoons) Milk of Magnesia the evening following surgery to encourage bowel movement.    BANDAGE    Take the bandage off the morning after surgery.     If you have tape strips, leave them on.  If they become loose, take them off.     Apply ice to incision periodically during the first 48 hours after surgery.    SHOWER    You may shower tomorrow.  Angela  the incision dry.    ACTIVITY    You may do what is comfortable.    It is not wise to lift weights, or do anything that requires maximal physical effort for several weeks. Avoid heavy lifting > 20 lbs and strenuous physical activity x 3-4 weeks.       Individual restrictions should be discussed with your surgeon.    You may drive when you are comfortable enough to drive safely and no longer on pain meds.  (Usually 3-4 days.)    WORK      You may return to non-physical work whenever you are comfortable.  (If you can drive, you probably can work.)  Physical work will be decided individually.    PAIN    You may have post-operative pain for several days.    Use the pain medication as directed at your discretion.    Expect gradual resolution of pain over several days.    Do not drink alcohol while you are taking pain medications.    You may take ibuprofen instead of or in addition to your prescription.  If you are taking the maximum amount of your prescription medication, you should not take any additional Tylenol.    EXPECTATIONS    You can expect: some swelling; black and blue areas possibly involving the testicles and penis; some numbness.  These are not dangerous.    Occasionally with laparoscopic surgery, patients can experience pain into their shoulders or upper back due to gas being trapped in the abdomen. This may take up to 48 hours to subside.    RETURN APPOINTMENT    Please make your post-operative appointment for 10-15 days after surgery.      We recommend making this appointment soon after your surgery date has been confirmed.    CALL OUR CLINICAL OFFICE AT [phone] IF YOU HAVE:    Fever or chills    Drainage from the incision(s)    Significant bleeding    Increasing pain after the first 36 hours    Any other concerns or questions                            [id number] OCT/2009    Pending Results     No orders found from 3/11/2017 to 3/14/2017.            Admission Information     Date & Time Provider  Department Dept. Phone    3/13/2017 Africa Whittington MD [facility] Same Day Surgery [phone]      Your Vitals Were     Blood Pressure Temperature Respirations Weight Pulse Oximetry BMI (Body Mass Index)    125/82 97.9  F (36.6  C) (Temporal) 13 102.1 kg (225 lb) 92% 29.69 kg/m2      MyChart Information     [facility] gives you secure access to your electronic health record. If you see a primary care provider, you can also send messages to your care team and make appointments. If you have questions, please call your primary care clinic.  If you do not have a primary care provider, please call [phone] and they will assist you.        Care EveryWhere ID     This is your Care EveryWhere ID. This could be used by other organizations to access your [facility] medical records  [id number]           Review of your medicines      START taking        Dose / Directions    oxyCODONE-acetaminophen 5-325 MG per tablet   Commonly known as:  PERCOCET   Used for:  Acute post-operative pain   Notes to Patient:  You took one tablet at 11:50 AM        Dose:  1-2 tablet   Take 1-2 tablets by mouth every 4 hours as needed for pain (moderate to severe)   Quantity:  30 tablet   Refills:  0       senna-docusate 8.6-50 MG per tablet   Commonly known as:  SENOKOT-S;PERICOLACE   Used for:  Acute post-operative pain        Dose:  1-2 tablet   Take 1-2 tablets by mouth 2 times daily Take while on oral narcotics to prevent or treat constipation.   Quantity:  30 tablet   Refills:  0         CONTINUE these medicines which have NOT CHANGED        Dose / Directions    Multi-vitamin Tabs tablet        Dose:  1 tablet   Take 1 tablet by mouth daily   Refills:  0         STOP taking     acetaminophen 325 MG tablet   Commonly known as:  TYLENOL                Where to get your medicines      These medications were sent to [facility] [place], [place] - [address]  [address] [place] [address] [address]     Phone:   [phone]     senna-docusate 8.6-50 MG per tablet         Some of these will need a paper prescription and others can be bought over the counter. Ask your nurse if you have questions.     Bring a paper prescription for each of these medications     oxyCODONE-acetaminophen 5-325 MG per tablet                Protect others around you: Learn how to safely use, store and throw away your medicines at www.disposemymeds.org.             Medication List: This is a list of all your medications and when to take them. Check marks below indicate your daily home schedule. Keep this list as a reference.      Medications           Morning Afternoon Evening Bedtime As Needed    Multi-vitamin Tabs tablet   Take 1 tablet by mouth daily                                oxyCODONE-acetaminophen 5-325 MG per tablet   Commonly known as:  PERCOCET   Take 1-2 tablets by mouth every 4 hours as needed for pain (moderate to severe)   Last time this was given:  1 tablet on 3/13/2017 11:50 AM   Notes to Patient:  You took one tablet at 11:50 AM                                senna-docusate 8.6-50 MG per tablet   Commonly known as:  SENOKOT-S;PERICOLACE   Take 1-2 tablets by mouth 2 times daily Take while on oral narcotics to prevent or treat constipation.

## 2017-03-13 NOTE — ADDENDUM NOTE
Addendum  created 03/13/17 1558 by Mihaela Mason APRN CRNA    Anesthesia Event edited, Anesthesia Staff edited

## 2017-03-13 NOTE — ANESTHESIA PREPROCEDURE EVALUATION
Procedure: Procedure(s):  LAPAROSCOPIC HERNIORRHAPHY INGUINAL  HERNIORRHAPHY UMBILICAL  Preop diagnosis: right inguinal hernia ; umbillical hernia   No Known Allergies  Patient Active Problem List   Diagnosis     CARDIOVASCULAR SCREENING; LDL GOAL LESS THAN 160     Arachnoid cyst     WPW syndrome     Epidermoid cyst     History of Jeanette-Parkinson-White (WPW) syndrome     Past Medical History   Diagnosis Date     Epidermoid cyst      brain     WPW syndrome      recurrent SVT- s/p ablation     Past Surgical History   Procedure Laterality Date     C ablation supravent arrhythmogenic focus  1993     Hc repair sliding inguinal hernia  2003     left     Craniotomy, retrosigmoid  4/7/2014     Procedure: CRANIOTOMY, RETROSIGMOID;  Left Retrosigmoid Approach, Resection Of Tumor, Ventriculostomy, Left  hip Fat [place] ;  Surgeon: Shalom Kebede MD;  Location: [facility]     Ventriculostomy  4/7/2014     Procedure: VENTRICULOSTOMY;;  Surgeon: Shalom Kebede MD;  Location: [facility] OR     Procure graft fat  4/7/2014     Procedure: PROCURE GRAFT FAT;;  Surgeon: Shalom Kebede MD;  Location: [facility] OR       No current facility-administered medications on file prior to encounter.   Current Outpatient Prescriptions on File Prior to Encounter:  multivitamin, therapeutic with minerals (MULTI-VITAMIN) TABS Take 1 tablet by mouth daily     /80  Temp 35.9  C (96.7  F) (Oral)  Resp 16  Wt 102.1 kg (225 lb)  SpO2 98%  BMI 29.69 kg/m2    Lab Results   Component Value Date    WBC 10.3 04/11/2014     Lab Results   Component Value Date    RBC 4.66 04/11/2014     Lab Results   Component Value Date    HGB 15.6 03/07/2017     Lab Results   Component Value Date    HCT 41.3 04/11/2014     Lab Results   Component Value Date    MCV 89 04/11/2014     Lab Results   Component Value Date    MCH 30.3 04/11/2014     Lab Results   Component Value Date    MCHC 34.1 04/11/2014     Lab Results   Component Value Date    RDW  12.5 04/11/2014     Lab Results   Component Value Date     04/11/2014     Lab Results   Component Value Date    INR 1.08 04/09/2014       Last Basic Metabolic Panel:  Lab Results   Component Value Date     04/11/2014      Lab Results   Component Value Date    POTASSIUM 4.1 04/11/2014     Lab Results   Component Value Date    CHLORIDE 104 04/11/2014     Lab Results   Component Value Date    MARIAM 9.3 04/11/2014     Lab Results   Component Value Date    CO2 30 04/11/2014     Lab Results   Component Value Date    BUN 16 04/11/2014     Lab Results   Component Value Date    CR 0.86 04/11/2014     Lab Results   Component Value Date     04/11/2014      HGB 15.6    EKG - SR, nl axis  Anesthesia Evaluation     . Pt has had prior anesthetic.     No history of anesthetic complications     ROS/MED HX    ENT/Pulmonary:     (+)tobacco use, Past use , . .   (-) sleep apnea and recent URI   Neurologic: Comment: Hx of epidermoid cyst s/p craniotomy 2014     (-) seizures, Neuropathy and migraines   Cardiovascular: Comment: Hx of WPW with recurrent SVT s/p ablation(20 years ago    (+) ----. : . . . :. dysrhythmias WPW, .      (-) hypertension, CHF and orthopnea/PND   METS/Exercise Tolerance:     Hematologic:  - neg hematologic  ROS       Musculoskeletal: Comment: Inguinal and umbilical  hernias        GI/Hepatic:  - neg GI/hepatic ROS      (-) GERD   Renal/Genitourinary:  - ROS Renal section negative       Endo:  - neg endo ROS    (-) Type II DM and thyroid disease   Psychiatric:  - neg psychiatric ROS       Infectious Disease:         Malignancy:         Other:               Physical Exam  Normal systems: cardiovascular, pulmonary and dental    Airway   Mallampati: II  TM distance: >3 FB  Neck ROM: full    Dental     Cardiovascular   Rhythm and rate: regular and normal      Pulmonary    breath sounds clear to auscultation                    Anesthesia Plan      History & Physical Review  History and physical  reviewed and following examination; no interval change.    ASA Status:  2 .    NPO Status:  > 8 hours    Plan for General and ETT   PONV prophylaxis:  Ondansetron (or other 5HT-3)       Postoperative Care  Postoperative pain management:  IV analgesics.      Consents  Anesthetic plan, risks, benefits and alternatives discussed with:  Patient..                          .

## 2017-03-13 NOTE — ANESTHESIA CARE TRANSFER NOTE
Patient: Volodymyr Valles    Procedure(s):  LAPAROCOPIC RIGHT INGUINAL HERNIA REPAIR WITH MESH; OPEN UMBILICAL HERNIA REPAIR WITH MESH .  - Wound Class: I-Clean   - Wound Class: I-Clean    Diagnosis: right inguinal hernia ; umbillical hernia   Diagnosis Additional Information: No value filed.    Anesthesia Type:   General, ETT     Note:  Airway :Face Mask  Patient transferred to:PACU  Comments: Spontaneously breathing, sats 98 - 100%, vT 300-400, TOF 4/4 with sustained tetany. Head lift x 5 seconds, following commands with 100% O2 at 15 L/min, suctioned x2, anesthesiologist present.  Extubated.  To PACU with O2 via SFM at 10 L/minute, VSS. Monitors on, report to RN.      Vitals: (Last set prior to Anesthesia Care Transfer)    CRNA VITALS  3/13/2017 1024 - 3/13/2017 1100      3/13/2017             Pulse: 103    SpO2: 93 %    Resp Rate (observed): 13    Resp Rate (set): 10                Electronically Signed By: CELESTE Avilez CRNA  March 13, 2017  11:00 AM

## 2017-03-13 NOTE — IP AVS SNAPSHOT
[facility] Same Day Surgery    [address]    [place] [address]    Phone:  [phone]    Fax:  [phone]                                       After Visit Summary   3/13/2017    Volodymyr Valles    MRN: [id number]           After Visit Summary Signature Page     I have received my discharge instructions, and my questions have been answered. I have discussed any challenges I see with this plan with the nurse or doctor.    ..........................................................................................................................................  Patient/Patient Representative Signature      ..........................................................................................................................................  Patient Representative Print Name and Relationship to Patient    ..................................................               ................................................  Date                                            Time    ..........................................................................................................................................  Reviewed by Signature/Title    ...................................................              ..............................................  Date                                                            Time

## 2017-03-13 NOTE — DISCHARGE INSTRUCTIONS
**If you have concerns or questions about your procedure,    please contact Dr Whittington at  [phone]**    Same Day Surgery Discharge Instructions for  Sedation and General Anesthesia       It's not unusual to feel dizzy, light-headed or faint for up to 24 hours after surgery or while taking pain medication.  If you have these symptoms: sit for a few minutes before standing and have someone assist you when you get up to walk or use the bathroom.      You should rest and relax for the next 24 hours. We recommend you make arrangements to have an adult stay with you for at least 24 hours after your discharge.  Avoid hazardous and strenuous activity.      DO NOT DRIVE any vehicle or operate mechanical equipment for 24 hours following the end of your surgery.  Even though you may feel normal, your reactions may be affected by the medication you have received.      Do not drink alcoholic beverages for 24 hours following surgery.       Slowly progress to your regular diet as you feel able. It's not unusual to feel nauseated and/or vomit after receiving anesthesia.  If you develop these symptoms, drink clear liquids (apple juice, ginger ale, broth, 7-up, etc. ) until you feel better.  If your nausea and vomiting persists for 24 hours, please notify your surgeon.        All narcotic pain medications, along with inactivity and anesthesia, can cause constipation. Drinking plenty of liquids and increasing fiber intake will help.      For any questions of a medical nature, call your surgeon.      Do not make important decisions for 24 hours.      If you had general anesthesia, you may have a sore throat for a couple of days related to the breathing tube used during surgery.  You may use Cepacol lozenges to help with this discomfort.  If it worsens or if you develop a fever, contact your surgeon.       If you feel your pain is not well managed with the pain medications prescribed by your surgeon, please contact your  surgeon's office to let them know so they can address your concerns.           [facility]  Discharge Instructions: Post-Operative Hernia    DIET    Return to the diet you were on before surgery.    Suggest plenty of liquids and high fiber foods to keep stools soft.      May take 1 oz. (2 tablespoons) Milk of Magnesia the evening following surgery to encourage bowel movement.    BANDAGE    Take the bandage off the morning after surgery.     If you have tape strips, leave them on.  If they become loose, take them off.     Apply ice to incision periodically during the first 48 hours after surgery.    SHOWER    You may shower tomorrow.  Pat the incision dry.    ACTIVITY    You may do what is comfortable.    It is not wise to lift weights, or do anything that requires maximal physical effort for several weeks. Avoid heavy lifting > 20 lbs and strenuous physical activity x 3-4 weeks.       Individual restrictions should be discussed with your surgeon.    You may drive when you are comfortable enough to drive safely and no longer on pain meds.  (Usually 3-4 days.)    WORK      You may return to non-physical work whenever you are comfortable.  (If you can drive, you probably can work.)  Physical work will be decided individually.    PAIN    You may have post-operative pain for several days.    Use the pain medication as directed at your discretion.    Expect gradual resolution of pain over several days.    Do not drink alcohol while you are taking pain medications.    You may take ibuprofen instead of or in addition to your prescription.  If you are taking the maximum amount of your prescription medication, you should not take any additional Tylenol.    EXPECTATIONS    You can expect: some swelling; black and blue areas possibly involving the testicles and penis; some numbness.  These are not dangerous.    Occasionally with laparoscopic surgery, patients can experience pain into their  shoulders or upper back due to gas being trapped in the abdomen. This may take up to 48 hours to subside.    RETURN APPOINTMENT    Please make your post-operative appointment for 10-15 days after surgery.      We recommend making this appointment soon after your surgery date has been confirmed.    CALL OUR CLINICAL OFFICE AT [phone] IF YOU HAVE:    Fever or chills    Drainage from the incision(s)    Significant bleeding    Increasing pain after the first 36 hours    Any other concerns or questions                            [id number] OCT/2009

## 2017-04-04 ENCOUNTER — OFFICE VISIT (OUTPATIENT)
Dept: SURGERY | Facility: CLINIC | Age: 37
End: 2017-04-04
Payer: COMMERCIAL

## 2017-04-04 DIAGNOSIS — Z09 FOLLOW-UP EXAMINATION FOLLOWING SURGERY: Primary | ICD-10-CM

## 2017-04-04 PROCEDURE — 99024 POSTOP FOLLOW-UP VISIT: CPT | Performed by: SURGERY

## 2017-04-04 NOTE — PROGRESS NOTES
Surgery Postoperative Note    S: Volodymyr returns today for follow up after a right inguinal hernia repair. He has been doing well. He reports there is mild redness near his umbilical incision. He denies fevers and chills. He has been eating normally and having regular bowel movements. He used pain medications for two days after surgery.     Abdomen: infraumbilical incision with mild erythema at left aspect of incision associated with irritation from the suture knot, no evidence of infection. Remainder of incisions c/d/i. No erythema or drainage.      A/P  Volodymyr Valles is recovering from a right Laparoscopic Inguinal Hernia repair and umbilical hernia repair. I advised him to slowly return to regular activity. I expect he will make a complete recovery without issues. If the suture on the umbilical incision is bothering him I explained I could remove it today. He denies pain and is comfortable with it going away in time. He will call if it becomes symptomatic and wants it removed.     Thank you for the opportunity to help in his care.    Africa Whittington  [facility]  [phone]    Please route or send letter to:  Primary Care Provider (PCP) and Referring Provider

## 2017-04-04 NOTE — MR AVS SNAPSHOT
After Visit Summary   4/4/2017    Volodymyr Valles    MRN: [id number]           Patient Information     Date Of Birth          1980        Visit Information        Provider Department      4/4/2017 8:30 AM Africa Whittington MD [facility] Hernia      Today's Diagnoses     Follow-up examination following surgery    -  1       Follow-ups after your visit        Who to contact     If you have questions or need follow up information about today's clinic visit or your schedule please contact [facility] directly at [phone].  Normal or non-critical lab and imaging results will be communicated to you by Spectralmindhart, letter or phone within 4 business days after the clinic has received the results. If you do not hear from us within 7 days, please contact the clinic through [employer] or phone. If you have a critical or abnormal lab result, we will notify you by phone as soon as possible.  Submit refill requests through Becual or call your pharmacy and they will forward the refill request to us. Please allow 3 business days for your refill to be completed.          Additional Information About Your Visit        MyChart Information     Becual gives you secure access to your electronic health record. If you see a primary care provider, you can also send messages to your care team and make appointments. If you have questions, please call your primary care clinic.  If you do not have a primary care provider, please call [phone] and they will assist you.        Care EveryWhere ID     This is your Care EveryWhere ID. This could be used by other organizations to access your [place] medical records  [id number]         Blood Pressure from Last 3 Encounters:   03/13/17 125/85   03/07/17 (!) 142/92   01/31/17 (!) 139/99    Weight from Last 3 Encounters:   03/13/17 225 lb (102.1 kg)   03/07/17 230 lb 3.2 oz (104.4 kg)   01/31/17 220 lb  (99.8 kg)              Today, you had the following     No orders found for display       Primary Care Provider Office Phone # Fax #    Wu Clay [phone]       [facility] [address] [facility] [address]        Thank you!     Thank you for choosing [facility] [place]  for your care. Our goal is always to provide you with excellent care. Hearing back from our patients is one way we can continue to improve our services. Please take a few minutes to complete the written survey that you may receive in the mail after your visit with us. Thank you!             Your Updated Medication List - Protect others around you: Learn how to safely use, store and throw away your medicines at www.disposemymeds.org.          This list is accurate as of: 4/4/17  8:44 AM.  Always use your most recent med list.                   Brand Name Dispense Instructions for use    Multi-vitamin Tabs tablet      Take 1 tablet by mouth daily       oxyCODONE-acetaminophen 5-325 MG per tablet    PERCOCET    30 tablet    Take 1-2 tablets by mouth every 4 hours as needed for pain (moderate to severe)       senna-docusate 8.6-50 MG per tablet    SENOKOT-S;PERICOLACE    30 tablet    Take 1-2 tablets by mouth 2 times daily Take while on oral narcotics to prevent or treat constipation.

## 2017-04-04 NOTE — LETTER
2017    Surgery Postoperative Note    RE:  Volodymyr Valles-:  [birth year]     S: Volodymyr returns today for follow up after a right inguinal hernia repair. He has been doing well. He reports there is mild redness near his umbilical incision. He denies fevers and chills. He has been eating normally and having regular bowel movements. He used pain medications for two days after surgery.      Abdomen: infraumbilical incision with mild erythema at left aspect of incision associated with irritation from the suture knot, no evidence of infection. Remainder of incisions c/d/i. No erythema or drainage.       A/P  Volodymyr Valles is recovering from a right Laparoscopic Inguinal Hernia repair and umbilical hernia repair. I advised him to slowly return to regular activity. I expect he will make a complete recovery without issues. If the suture on the umbilical incision is bothering him I explained I could remove it today. He denies pain and is comfortable with it going away in time. He will call if it becomes symptomatic and wants it removed.      Thank you for the opportunity to help in his care.     Africa Whittington  [facility]  [phone]

## 2018-01-22 ENCOUNTER — PRE VISIT (OUTPATIENT)
Dept: NEUROSURGERY | Facility: CLINIC | Age: 38
End: 2018-01-22

## 2018-01-22 DIAGNOSIS — L72.0 EPIDERMOID CYST: Primary | ICD-10-CM

## 2018-01-30 ENCOUNTER — RADIANT APPOINTMENT (OUTPATIENT)
Dept: MRI IMAGING | Facility: CLINIC | Age: 38
End: 2018-01-30
Attending: NEUROLOGICAL SURGERY
Payer: COMMERCIAL

## 2018-01-30 ENCOUNTER — OFFICE VISIT (OUTPATIENT)
Dept: NEUROSURGERY | Facility: CLINIC | Age: 38
End: 2018-01-30
Payer: COMMERCIAL

## 2018-01-30 VITALS
WEIGHT: 215.9 LBS | BODY MASS INDEX: 28.61 KG/M2 | SYSTOLIC BLOOD PRESSURE: 123 MMHG | HEART RATE: 67 BPM | DIASTOLIC BLOOD PRESSURE: 81 MMHG | HEIGHT: 73 IN

## 2018-01-30 DIAGNOSIS — D49.9: Primary | ICD-10-CM

## 2018-01-30 DIAGNOSIS — L72.0 EPIDERMOID CYST: ICD-10-CM

## 2018-01-30 DIAGNOSIS — G93.0 EPIDERMOID CYST OF BRAIN: Primary | ICD-10-CM

## 2018-01-30 RX ORDER — GADOBUTROL 604.72 MG/ML
10 INJECTION INTRAVENOUS ONCE
Status: COMPLETED | OUTPATIENT
Start: 2018-01-30 | End: 2018-01-30

## 2018-01-30 RX ADMIN — GADOBUTROL 10 ML: 604.72 INJECTION INTRAVENOUS at 14:08

## 2018-01-30 ASSESSMENT — PAIN SCALES - GENERAL: PAINLEVEL: NO PAIN (0)

## 2018-01-30 NOTE — PROGRESS NOTES
Dear Dr. Clay:    We saw Mr. 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 We have not seen him in some time. He has been hesitant to undergo a second operation on this tumor as he remains asymptomatic. His first surgery involved removing the large left posterior fossa component through an extended retrosigmoid approach was on 04/07/2014. Over the past few years, he has remained healthy. He is busy at home with two children. His balance is good. His feelings of christi vu have resolved. He still has occasional headaches and intermittent dizziness that are both tolerable and improved compared to before his surgery. He has not had any seizures or other cognitive problems. He has no diplopia.    On exam, his general appearance is good. Extraocular movements intact. He moves all extremities equally and with good coordination. Gross neurological examination is normal.    REVIEW OF STUDIES: We reviewed his MRI from today and compared it to a study from 2015. His epidermoid is best seen on the T2 and DWI sequences. We do not see any obvious infratentorial component of the epidermoid. However, the supratentorial part and the part straddling the tentorium is substantial. It has grown minimally over the past three years. There is no edema in the surrounding brain. It has displaced the middle cerebral artery superiorly and anteriorly. Overall, imaging shows slight growth in the mass at the expected rate.    IMPRESSION AND PLAN: Given his young age and the large size of the residual epidermoid, we have long advocated for a second stage surgery. Because he is asymptomatic, there has been no urgency to surgery. He is leaning towards surgery sometime in the next 1-2 years. He would like for his children to get a bit older. A mild amount of growth will not affect our surgical risk, and we agree with that timeframe.  We will see him back in 1 year with another MRI. Please do not  hesitate to contact us with questions. We will keep you informed of his progress.    FADIA PRINGLE MD    I, Oscar Pitts, am serving as a scribe to document services personally performed by Fadia Pringle MD, based upon my observations and the provider's statements to me. All documentation has been reviewed by the aforementioned doctor prior to being entered into the official medical record.    I, Fadia Pringle, attest that above named individual is acting in scribe capacity, has observed my performance of the services and has documented them in accordance with my direction. The documentation recorded by the scribe accurately reflects the service I personally performed and the decisions made by me.

## 2018-01-30 NOTE — LETTER
1/30/2018       RE: Volodymyr Valles  [address] [facility] [address]     Dear Colleague,    Thank you for referring your patient, Volodymyr Valles, to the [facility] at [facility]. Please see a copy of my visit note below.    Dear Dr. Clay:    We saw Mr. 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 We have not seen him in some time. He has been hesitant to undergo a second operation on this tumor as he remains asymptomatic. His first surgery involved removing the large left posterior fossa component through an extended retrosigmoid approach was on 04/07/2014. Over the past few years, he has remained healthy. He is busy at home with two children. His balance is good. His feelings of christi vu have resolved. He still has occasional headaches and intermittent dizziness that are both tolerable and improved compared to before his surgery. He has not had any seizures or other cognitive problems. He has no diplopia.    On exam, his general appearance is good. Extraocular movements intact. He moves all extremities equally and with good coordination. Gross neurological examination is normal.    REVIEW OF STUDIES: We reviewed his MRI from today and compared it to a study from 2015. His epidermoid is best seen on the T2 and DWI sequences. We do not see any obvious infratentorial component of the epidermoid. However, the supratentorial part and the part straddling the tentorium is substantial. It has grown minimally over the past three years. There is no edema in the surrounding brain. It has displaced the middle cerebral artery superiorly and anteriorly. Overall, imaging shows slight growth in the mass at the expected rate.    IMPRESSION AND PLAN: Given his young age and the large size of the residual epidermoid, we have long advocated for a second stage surgery. Because he is asymptomatic, there  has been no urgency to surgery. He is leaning towards surgery sometime in the next 1-2 years. He would like for his children to get a bit older. A mild amount of growth will not affect our surgical risk, and we agree with that timeframe.  We will see him back in 1 year with another MRI. Please do not hesitate to contact us with questions. We will keep you informed of his progress.    FADIA PRINGLE MD    I, Oscar Pitts, am serving as a scribe to document services personally performed by Fadia Pringle MD, based upon my observations and the provider's statements to me. All documentation has been reviewed by the aforementioned doctor prior to being entered into the official medical record.    I, Fadia Pringle, attest that above named individual is acting in scribe capacity, has observed my performance of the services and has documented them in accordance with my direction. The documentation recorded by the scribe accurately reflects the service I personally performed and the decisions made by me.    Again, thank you for allowing me to participate in the care of your patient.      Sincerely,    Fadia Pringle MD

## 2018-01-30 NOTE — DISCHARGE INSTRUCTIONS
MRI Contrast Discharge Instructions    The IV contrast you received today will pass out of your body in your  urine. This will happen in the next 24 hours. You will not feel this process.  Your urine will not change color.    Drink at least 4 extra glasses of water or juice today (unless your doctor  has restricted your fluids). This reduces the stress on your kidneys.  You may take your regular medicines.    If you are on dialysis: It is best to have dialysis today.    If you have a reaction: Most reactions happen right away. If you have  any new symptoms after leaving the hospital (such as hives or swelling),  call your hospital at the correct number below. Or call your family doctor.  If you have breathing distress or wheezing, call 911.    Special instructions: ***    I have read and understand the above information.    Signature:______________________________________ Date:___________    Staff:__________________________________________ Date:___________     Time:__________    [facility] Departments:    ___[place]: [phone]  ___[facility]: [phone]  ___[place]: [phone] ___[facility]: [phone]  ___[facility]: [phone]  ___[facility]: [phone]  ___[phone]  ___[facility]: [phone]  ___[phone]

## 2018-01-30 NOTE — MR AVS SNAPSHOT
After Visit Summary   1/30/2018    Volodymyr Valles    MRN: [id number]           Patient Information     Date Of Birth          1980        Visit Information        Provider Department      1/30/2018 3:00 PM Shalom Kebede MD [facility] Neurosurgery        Care Instructions    Follow-up in 1 year.  Have a MRI of the brain prior to the appointment.          Follow-ups after your visit        Your next 10 appointments already scheduled     Jan 29, 2019  1:00 PM CST   (Arrive by 12:45 PM)   MR BRAIN W/O CONTRAST with [id number]   [facility] [place] [facility] ([facility])    [address] [address] [phone]           Take your medicines as usual, unless your doctor tells you not to. Bring a list of your current medicines to your exam (including vitamins, minerals and over-the-counter drugs). Also bring the results of similar scans you may have had.  Please remove any body piercings and hair extensions before you arrive.  Follow your doctor s orders. If you do not, we may have to postpone your exam.  You will not have contrast for this exam. You do not need to do anything special to prepare.  The MRI machine uses a strong magnet. Please wear clothes without metal (snaps, zippers). A sweatsuit works well, or we may give you a hospital gown.   **IMPORTANT** THE INSTRUCTIONS BELOW ARE ONLY FOR THOSE PATIENTS WHO HAVE BEEN TOLD THEY WILL RECEIVE SEDATION OR GENERAL ANESTHESIA DURING THEIR MRI PROCEDURE:  IF YOU WILL RECEIVE SEDATION (take medicine to help you relax during your exam):   You must get the medicine from your doctor before you arrive. Bring the medicine to the exam. Do not take it at home.   Arrive one hour early. Bring someone who can take you home after the test. Your medicine will make you sleepy. After the exam, you may not drive, take a bus or take a taxi by yourself.   No eating 8 hours before your exam. You  may have clear liquids up until 4 hours before your exam. (Clear liquids include water, clear tea, black coffee and fruit juice without pulp.)  IF YOU WILL RECEIVE ANESTHESIA (be asleep for your exam):   Arrive 1 1/2 hours early. Bring someone who can take you home after the test. You may not drive, take a bus or take a taxi by yourself.   No eating 8 hours before your exam. You may have clear liquids up until 4 hours before your exam. (Clear liquids include water, clear tea, black coffee and fruit juice without pulp.)   You will spend four to five hours in the recovery room.  Please call the Imaging Department at your exam site with any questions.            Jan 29, 2019  2:00 PM CST   (Arrive by 1:45 PM)   Return Visit with Shalom Kebede MD   [facility] ([facility] [place])    [address] [address] [phone]              Future tests that were ordered for you today     Open Future Orders        Priority Expected Expires Ordered    MRI Brain without gadolinium [[facility]710] Routine 1/31/2018 1/31/2019 1/30/2018            Who to contact     Please call your clinic at [phone] to:    Ask questions about your health    Make or cancel appointments    Discuss your medicines    Learn about your test results    Speak to your doctor   If you have compliments or concerns about an experience at your clinic, or if you wish to file a complaint, please contact [facility] Patient Relations at [phone] or email us at [email or web address]         Additional Information About Your Visit        [employer] Information     [employer] gives you secure access to your electronic health record. If you see a primary care provider, you can also send messages to your care team and make appointments. If you have questions, please call your primary care clinic.  If you do not have a primary care provider, please call  "[phone] and they will assist you.      [employer] is an electronic gateway that provides easy, online access to your medical records. With [employer], you can request a clinic appointment, read your test results, renew a prescription or communicate with your care team.     To access your existing account, please contact your [facility] or call [phone] for assistance.        Care EveryWhere ID     This is your Care EveryWhere ID. This could be used by other organizations to access your [facility] medical records  [id number]        Your Vitals Were     Pulse Height BMI (Body Mass Index)             67 1.854 m (6' 1\") 28.48 kg/m2          Blood Pressure from Last 3 Encounters:   01/30/18 123/81   03/13/17 125/85   03/07/17 (!) 142/92    Weight from Last 3 Encounters:   01/30/18 97.9 kg (215 lb 14.4 oz)   03/13/17 102.1 kg (225 lb)   03/07/17 104.4 kg (230 lb 3.2 oz)              Today, you had the following     No orders found for display       Primary Care Provider Office Phone # Fax #    Wu Clay [phone]       [address] [place] [address]        Equal Access to Services     TITO DUBOIS : Hadii cassandra ku raymondo Socatieali, waaxda luqadaha, qaybta kaalmada adeegyada, placido cartagena . So [facility] [phone].    ATENCIÓN: Si habla español, tiene a emanuel disposición servicios gratuitos de asistencia lingüística. Llame al [phone].    We comply with applicable federal civil rights laws and [place] laws. We do not discriminate on the basis of race, color, national origin, age, disability, sex, sexual orientation, or gender identity.            Thank you!     Thank you for choosing [facility]  for your care. Our goal is always to provide you with excellent care. Hearing back from our patients is one way we can continue to improve our services. Please take a few minutes to complete the written survey that you may " receive in the mail after your visit with us. Thank you!             Your Updated Medication List - Protect others around you: Learn how to safely use, store and throw away your medicines at www.disposemymeds.org.          This list is accurate as of 1/30/18  4:15 PM.  Always use your most recent med list.                   Brand Name Dispense Instructions for use Diagnosis    ADVIL PO      Take by mouth every 6 hours as needed for moderate pain        Multi-vitamin Tabs tablet      Take 1 tablet by mouth daily

## 2018-06-08 ENCOUNTER — TRANSFERRED RECORDS (OUTPATIENT)
Dept: HEALTH INFORMATION MANAGEMENT | Facility: CLINIC | Age: 38
End: 2018-06-08

## 2018-10-05 ENCOUNTER — TRANSFERRED RECORDS (OUTPATIENT)
Dept: HEALTH INFORMATION MANAGEMENT | Facility: CLINIC | Age: 38
End: 2018-10-05

## 2019-01-29 ENCOUNTER — ANCILLARY PROCEDURE (OUTPATIENT)
Dept: MRI IMAGING | Facility: CLINIC | Age: 39
End: 2019-01-29
Attending: NEUROLOGICAL SURGERY
Payer: COMMERCIAL

## 2019-01-29 DIAGNOSIS — D49.9: ICD-10-CM

## 2019-02-13 ENCOUNTER — OFFICE VISIT (OUTPATIENT)
Dept: NEUROSURGERY | Facility: CLINIC | Age: 39
End: 2019-02-13
Payer: COMMERCIAL

## 2019-02-13 VITALS
DIASTOLIC BLOOD PRESSURE: 73 MMHG | HEART RATE: 67 BPM | SYSTOLIC BLOOD PRESSURE: 116 MMHG | RESPIRATION RATE: 16 BRPM | WEIGHT: 209.4 LBS | HEIGHT: 73 IN | TEMPERATURE: 97 F | BODY MASS INDEX: 27.75 KG/M2 | OXYGEN SATURATION: 97 %

## 2019-02-13 DIAGNOSIS — G93.0 EPIDERMOID CYST OF BRAIN: Primary | ICD-10-CM

## 2019-02-13 PROBLEM — K08.89: Status: ACTIVE | Noted: 2018-10-06

## 2019-02-13 PROBLEM — R56.9 SEIZURE (H): Status: ACTIVE | Noted: 2018-10-06

## 2019-02-13 PROBLEM — S02.412A: Status: ACTIVE | Noted: 2018-10-06

## 2019-02-13 PROBLEM — S03.2XXA TOOTH AVULSION: Status: ACTIVE | Noted: 2018-10-06

## 2019-02-13 PROBLEM — I60.9 SAH (SUBARACHNOID HEMORRHAGE) (H): Status: ACTIVE | Noted: 2018-10-05

## 2019-02-13 PROBLEM — S02.5XXA TOOTH FRACTURE: Status: ACTIVE | Noted: 2018-10-06

## 2019-02-13 RX ORDER — LEVETIRACETAM 500 MG/1
1000 TABLET ORAL 2 TIMES DAILY
COMMUNITY
Start: 2018-10-19 | End: 2024-09-28

## 2019-02-13 ASSESSMENT — MIFFLIN-ST. JEOR: SCORE: 1913.71

## 2019-02-13 ASSESSMENT — PAIN SCALES - GENERAL: PAINLEVEL: NO PAIN (0)

## 2019-02-13 NOTE — LETTER
2/13/2019      RE: Volodymyr Valles  [address] [facility] [address]       Dear Dr. Clay:    We saw Mr. 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 We have not seen him in some time. He has been hesitant to undergo a second operation on this epidermoid as he had been asymptomatic asymptomatic. His first surgery involved removing the large left posterior fossa component through an extended retrosigmoid approach was on 04/07/2014. Currently, Mr. Valles is doing well but he is no longer asymptomatic. He suffered a seizure last October (2018) that resulted in traumatic subarachnoid hemorrhage, le Fort II fracture, and multiple loose teeth. He is currently on Keppra. He denies numbness in his face. He has not had any further seizures.    On exam, most obvious feature is that he is missing his upper incisors. He has also lost a fair amount of weight secondary to his facial surgeries. Extraocular movements intact. He moves all extremities equally and with good coordination. Gross neurological examination remains normal. NIH stroke scale score is 0.    REVIEW OF STUDIES: We reviewed his recent MRI with him and his wife and compared it to his previous MRIs, including the original study from 2013. The epidermoid is best seen on the DWI sequence and the FLAIR sequences. This epidermoid has multiple components that extend into different compartments. There has been minimal recurrence of the left posterior fossa component and there is no compression of the kris. There is a large component involving the left suprasellar space and crural cistern, resulting in marked compression of the left uncus and anteromedial temporal lobe. There is a third component which is a bridge between the other two components. This straddles the tentorial incisura and is resulting in compression of the midbrain. Ventricle size is normal. While there has not been  appreciable growth in the epidermoid since last year, there has been progressive growth in the supratentorial part over the past five years. On the FLAIR sequence, there is a thin layer of hyperintensity in the medial temporal lobe that is likely secondary to mass effect.    IMPRESSION AND PLAN: Mr. Valles was hoping to avoid surgery for a few more years until his children are older. His recent seizure is a sufficient reason to plan surgery for the supratentorial portion of the epidermoid. Because the tumor extends posteriorly a fair amount, we believe that a frontotemporal craniotomy with a zygomatic osteotomy will be sufficient. This approach should allow us to mobilize the temporal lobe and get as far posterior as possible. We stressed to him that we will not be able to get the entire remaining lesion out through this approach. We discussed the high likelihood of leaving some of the capsule attached to perforating arteries. We went over the risks of surgery including stroke, cranial nerve injury (specifically oculomotor injury), hemorrhage, infection, CSF leak, seizures, and he agrees to proceed. He prefers to wait until after the summer for surgery. We are fine with this plan and we will aim for surgery some time in September 2019.    Please do not hesitate to contact us with questions. We will keep you informed of his progress.    FADIA PRINGLE MD    ICoral, am serving as a scribe to document services personally performed by Fadia Pringle MD, based upon my observations and the provider's statements to me. All documentation has been reviewed by the aforementioned doctor prior to being entered into the official medical record.    I, Fadia Pringle, attest that above named individual is acting in scribe capacity, has observed my performance of the services and has documented them in accordance with my direction. The documentation recorded by the scribe accurately reflects the  service I personally performed and the decisions made by me.

## 2019-02-13 NOTE — PROGRESS NOTES
Dear Dr. Clay:    We saw Mr. 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 We have not seen him in some time. He has been hesitant to undergo a second operation on this epidermoid as he had been asymptomatic asymptomatic. His first surgery involved removing the large left posterior fossa component through an extended retrosigmoid approach was on 04/07/2014. Currently, Mr. Valles is doing well but he is no longer asymptomatic. He suffered a seizure last October (2018) that resulted in traumatic subarachnoid hemorrhage, le Fort II fracture, and multiple loose teeth. He is currently on Keppra. He denies numbness in his face. He has not had any further seizures.    On exam, most obvious feature is that he is missing his upper incisors. He has also lost a fair amount of weight secondary to his facial surgeries. Extraocular movements intact. He moves all extremities equally and with good coordination. Gross neurological examination remains normal. NIH stroke scale score is 0.    REVIEW OF STUDIES: We reviewed his recent MRI with him and his wife and compared it to his previous MRIs, including the original study from 2013. The epidermoid is best seen on the DWI sequence and the FLAIR sequences. This epidermoid has multiple components that extend into different compartments. There has been minimal recurrence of the left posterior fossa component and there is no compression of the kris. There is a large component involving the left suprasellar space and crural cistern, resulting in marked compression of the left uncus and anteromedial temporal lobe. There is a third component which is a bridge between the other two components. This straddles the tentorial incisura and is resulting in compression of the midbrain. Ventricle size is normal. While there has not been appreciable growth in the epidermoid since last year, there has been progressive growth in  the supratentorial part over the past five years. On the FLAIR sequence, there is a thin layer of hyperintensity in the medial temporal lobe that is likely secondary to mass effect.    IMPRESSION AND PLAN: Mr. Valles was hoping to avoid surgery for a few more years until his children are older. His recent seizure is a sufficient reason to plan surgery for the supratentorial portion of the epidermoid. Because the tumor extends posteriorly a fair amount, we believe that a frontotemporal craniotomy with a zygomatic osteotomy will be sufficient. This approach should allow us to mobilize the temporal lobe and get as far posterior as possible. We stressed to him that we will not be able to get the entire remaining lesion out through this approach. We discussed the high likelihood of leaving some of the capsule attached to perforating arteries. We went over the risks of surgery including stroke, cranial nerve injury (specifically oculomotor injury), hemorrhage, infection, CSF leak, seizures, and he agrees to proceed. He prefers to wait until after the summer for surgery. We are fine with this plan and we will aim for surgery some time in September 2019.    Please do not hesitate to contact us with questions. We will keep you informed of his progress.    FADIA PRINGLE MD    ICoral, am serving as a scribe to document services personally performed by Fadia Pringle MD, based upon my observations and the provider's statements to me. All documentation has been reviewed by the aforementioned doctor prior to being entered into the official medical record.    I, Fadia Pringle, attest that above named individual is acting in scribe capacity, has observed my performance of the services and has documented them in accordance with my direction. The documentation recorded by the scribe accurately reflects the service I personally performed and the decisions made by me.

## 2019-02-13 NOTE — LETTER
2/13/2019       RE: Volodymyr Valles  [address] [facility] [address]     Dear Colleague,    Thank you for referring your patient, Volodymyr Valles, to the [facility]ERY at [facility]. Please see a copy of my visit note below.    Dear Dr. Clay:    We saw Mr. 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 We have not seen him in some time. He has been hesitant to undergo a second operation on this epidermoid as he had been asymptomatic asymptomatic. His first surgery involved removing the large left posterior fossa component through an extended retrosigmoid approach was on 04/07/2014. Currently, Mr. Valles is doing well but he is no longer asymptomatic. He suffered a seizure last October (2018) that resulted in traumatic subarachnoid hemorrhage, le [place] II fracture, and multiple loose teeth. He is currently on Keppra. He denies numbness in his face. He has not had any further seizures.    On exam, most obvious feature is that he is missing his upper incisors. He has also lost a fair amount of weight secondary to his facial surgeries. Extraocular movements intact. He moves all extremities equally and with good coordination. Gross neurological examination remains normal. NIH stroke scale score is 0.    REVIEW OF STUDIES: We reviewed his recent MRI with him and his wife and compared it to his previous MRIs, including the original study from 2013. The epidermoid is best seen on the DWI sequence and the FLAIR sequences. This epidermoid has multiple components that extend into different compartments. There has been minimal recurrence of the left posterior fossa component and there is no compression of the kris. There is a large component involving the left suprasellar space and crural cistern, resulting in marked compression of the left uncus and anteromedial temporal lobe. There is a third  component which is a bridge between the other two components. This straddles the tentorial incisura and is resulting in compression of the midbrain. Ventricle size is normal. While there has not been appreciable growth in the epidermoid since last year, there has been progressive growth in the supratentorial part over the past five years. On the FLAIR sequence, there is a thin layer of hyperintensity in the medial temporal lobe that is likely secondary to mass effect.    IMPRESSION AND PLAN: Mr. Valles was hoping to avoid surgery for a few more years until his children are older. His recent seizure is a sufficient reason to plan surgery for the supratentorial portion of the epidermoid. Because the tumor extends posteriorly a fair amount, we believe that a frontotemporal craniotomy with a zygomatic osteotomy will be sufficient. This approach should allow us to mobilize the temporal lobe and get as far posterior as possible. We stressed to him that we will not be able to get the entire remaining lesion out through this approach. We discussed the high likelihood of leaving some of the capsule attached to perforating arteries. We went over the risks of surgery including stroke, cranial nerve injury (specifically oculomotor injury), hemorrhage, infection, CSF leak, seizures, and he agrees to proceed. He prefers to wait until after the summer for surgery. We are fine with this plan and we will aim for surgery some time in September 2019.    Please do not hesitate to contact us with questions. We will keep you informed of his progress.    MD JHONATAN WADE Asef Chowdhury, am serving as a scribe to document services personally performed by Shalom Kebede MD, based upon my observations and the provider's statements to me. All documentation has been reviewed by the aforementioned doctor prior to being entered into the official medical record.    JHONATAN, Shalom Kebede, attest that above named individual is  acting in scribe capacity, has observed my performance of the services and has documented them in accordance with my direction. The documentation recorded by the scribe accurately reflects the service I personally performed and the decisions made by me.

## 2019-02-13 NOTE — PATIENT INSTRUCTIONS
Work with Dr. Delio Dickens's nurse, about coordinating for surgery in September.  Her number is [phone].

## 2019-08-20 ENCOUNTER — CARE COORDINATION (OUTPATIENT)
Dept: NEUROSURGERY | Facility: CLINIC | Age: 39
End: 2019-08-20

## 2019-08-20 NOTE — PROGRESS NOTES
Writer responded to Epic message asking about patient FMLA. Nothing has been scanned to the patient chart. WRiter notified sending that writer will contact [employer] and the patient and completed accordingly.

## 2019-09-10 ENCOUNTER — CARE COORDINATION (OUTPATIENT)
Dept: NEUROSURGERY | Facility: CLINIC | Age: 39
End: 2019-09-10

## 2019-09-10 NOTE — PROGRESS NOTES
Writer left message for patient; writer is receipt of [facility] paperwork. 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 Therefore no communication until patient returned. Writer gave patient direct phone.   Writer stated in voice mail to patient that writer is in receipt of paperwork and will contact patient when completed. In addition reinforced for patient to contact writer.

## 2019-09-12 ENCOUNTER — PRE VISIT (OUTPATIENT)
Dept: SURGERY | Facility: CLINIC | Age: 39
End: 2019-09-12

## 2019-09-12 NOTE — TELEPHONE ENCOUNTER
FUTURE VISIT INFORMATION      SURGERY INFORMATION:    Date: 10/7/19    Location: [facility] OR    Surgeon:  Shalom Kebede    Anesthesia Type:  General    RECORDS REQUESTED FROM:       [facility]    Pertinent Medical History: Seizure, WPW Syndrome    Most recent EKG+ Tracing: 10/5/18- [facility]- requested tracing- received and sent to scanning

## 2019-09-13 ENCOUNTER — TELEPHONE (OUTPATIENT)
Dept: NEUROSURGERY | Facility: CLINIC | Age: 39
End: 2019-09-13

## 2019-09-13 ENCOUNTER — CARE COORDINATION (OUTPATIENT)
Dept: NEUROSURGERY | Facility: CLINIC | Age: 39
End: 2019-09-13

## 2019-09-13 NOTE — TELEPHONE ENCOUNTER
Writer left voice mail returning call regarding forms for physician statement. Writer is in receipt of form faxed from [employer] on 09/09/2019. Writer left direct phone number for patient

## 2019-09-30 PROBLEM — K02.9 CARIES: Status: ACTIVE | Noted: 2019-03-01

## 2019-09-30 PROBLEM — K05.329: Status: ACTIVE | Noted: 2019-03-01

## 2019-09-30 PROBLEM — K08.403 PARTIAL EDENTULISM, CLASS III: Status: ACTIVE | Noted: 2019-03-01

## 2019-09-30 PROBLEM — M27.0 TORUS MANDIBULARIS: Status: ACTIVE | Noted: 2019-03-01

## 2019-10-01 ENCOUNTER — ANESTHESIA EVENT (OUTPATIENT)
Dept: SURGERY | Facility: CLINIC | Age: 39
End: 2019-10-01
Payer: COMMERCIAL

## 2019-10-01 ENCOUNTER — OFFICE VISIT (OUTPATIENT)
Dept: SURGERY | Facility: CLINIC | Age: 39
End: 2019-10-01
Payer: COMMERCIAL

## 2019-10-01 ENCOUNTER — PATIENT OUTREACH (OUTPATIENT)
Dept: NEUROSURGERY | Facility: CLINIC | Age: 39
End: 2019-10-01

## 2019-10-01 VITALS
OXYGEN SATURATION: 95 % | BODY MASS INDEX: 29.66 KG/M2 | SYSTOLIC BLOOD PRESSURE: 127 MMHG | DIASTOLIC BLOOD PRESSURE: 81 MMHG | RESPIRATION RATE: 16 BRPM | TEMPERATURE: 98.1 F | HEART RATE: 66 BPM | HEIGHT: 72 IN | WEIGHT: 219 LBS

## 2019-10-01 DIAGNOSIS — L72.0 EPIDERMOID CYST: ICD-10-CM

## 2019-10-01 DIAGNOSIS — Z01.818 PREOP EXAMINATION: Primary | ICD-10-CM

## 2019-10-01 DIAGNOSIS — Z01.818 PREOP EXAMINATION: ICD-10-CM

## 2019-10-01 LAB
ABO + RH BLD: NORMAL
ABO + RH BLD: NORMAL
ANION GAP SERPL CALCULATED.3IONS-SCNC: 3 MMOL/L (ref 3–14)
APTT PPP: 32 SEC (ref 22–37)
BLD GP AB SCN SERPL QL: NORMAL
BLOOD BANK CMNT PATIENT-IMP: NORMAL
BLOOD BANK CMNT PATIENT-IMP: NORMAL
BUN SERPL-MCNC: 14 MG/DL (ref 7–30)
CALCIUM SERPL-MCNC: 9.3 MG/DL (ref 8.5–10.1)
CHLORIDE SERPL-SCNC: 106 MMOL/L (ref 94–109)
CO2 SERPL-SCNC: 30 MMOL/L (ref 20–32)
CREAT SERPL-MCNC: 0.94 MG/DL (ref 0.66–1.25)
ERYTHROCYTE [DISTWIDTH] IN BLOOD BY AUTOMATED COUNT: 12.4 % (ref 10–15)
GFR SERPL CREATININE-BSD FRML MDRD: >90 ML/MIN/{1.73_M2}
GLUCOSE SERPL-MCNC: 81 MG/DL (ref 70–99)
HCT VFR BLD AUTO: 49.4 % (ref 40–53)
HGB BLD-MCNC: 16.6 G/DL (ref 13.3–17.7)
INR PPP: 0.98 (ref 0.86–1.14)
MCH RBC QN AUTO: 30.6 PG (ref 26.5–33)
MCHC RBC AUTO-ENTMCNC: 33.6 G/DL (ref 31.5–36.5)
MCV RBC AUTO: 91 FL (ref 78–100)
PLATELET # BLD AUTO: 171 10E9/L (ref 150–450)
POTASSIUM SERPL-SCNC: 4.2 MMOL/L (ref 3.4–5.3)
RBC # BLD AUTO: 5.43 10E12/L (ref 4.4–5.9)
SODIUM SERPL-SCNC: 139 MMOL/L (ref 133–144)
SPECIMEN EXP DATE BLD: NORMAL
WBC # BLD AUTO: 7.4 10E9/L (ref 4–11)

## 2019-10-01 RX ORDER — CEFAZOLIN SODIUM 1 G/3ML
1 INJECTION, POWDER, FOR SOLUTION INTRAMUSCULAR; INTRAVENOUS SEE ADMIN INSTRUCTIONS
Status: CANCELLED | OUTPATIENT
Start: 2019-10-01

## 2019-10-01 RX ORDER — CEFAZOLIN SODIUM 2 G/100ML
2 INJECTION, SOLUTION INTRAVENOUS
Status: CANCELLED | OUTPATIENT
Start: 2019-10-01

## 2019-10-01 ASSESSMENT — LIFESTYLE VARIABLES: TOBACCO_USE: 1

## 2019-10-01 ASSESSMENT — ENCOUNTER SYMPTOMS: SEIZURES: 1

## 2019-10-01 ASSESSMENT — MIFFLIN-ST. JEOR: SCORE: 1936.38

## 2019-10-01 NOTE — PROGRESS NOTES
In Person    Pre-op Teaching    Procedure: Left transzygomatic approach for resection of tumor  Planned Surgery Date: 10/7/2019  Surgeon: Dr. Kebede                Discussed pre-op routine and requirements to include:  surgical procedure, post-op recovery and expectations, need for H&P, NPO prior to OR, pre-op antibacterial showers, pain control and importance of follow-up visits.  Surgery scheduling will coordinate OR time/date and update patient as appropriate.  The Pre-Admission Office will call to re-inforce instructions 24-48 hours prior to surgery. The Pre-Admission contact number is [phone], [phone] M-F.      Ample time was provided for patient questions and in-depth discussion of topics of heightened interest.  Reviewed medication list and provided instructions regarding what medications to stop prior to surgery: Ibuprofen.   Anti-bacterial soap solution for pre-op showers will be provided at PAC visit as well as specific instructions for use. Approximately 20 minutes spent with patient/family discussing and reviewing.      Patient provided triage contact number for questions or concerns that may arise prior to surgery. Pre-op folder with specific written instructions given to patient for review.

## 2019-10-01 NOTE — ANESTHESIA PREPROCEDURE EVALUATION
Anesthesia Pre-Procedure Evaluation    Patient: Volodymyr Valles   MRN:     [id number] Gender:   male   Age:    39 year old :      1980        Preoperative Diagnosis: Epidermoid Cyst Of Brain   Procedure(s):  Left Transzygomatic Approach For Resection Of Tumor     Past Medical History:   Diagnosis Date     Epidermoid cyst     brain     Seizures (H)      WPW syndrome     recurrent SVT- s/p ablation      Past Surgical History:   Procedure Laterality Date     C ABLATION SUPRAVENT ARRHYTHMOGENIC FOCUS       CRANIOTOMY, RETROSIGMOID  2014    Procedure: CRANIOTOMY, RETROSIGMOID;  Left Retrosigmoid Approach, Resection Of Tumor, Ventriculostomy, Left  hip Fat [place] ;  Surgeon: Shalom Kebede MD;  Location: [facility]     HC REPAIR SLIDING INGUINAL HERNIA  2003    left     HERNIORRHAPHY UMBILICAL N/A 3/13/2017    Procedure: HERNIORRHAPHY UMBILICAL;  Surgeon: Africa Whittington MD;  Location: [facility]     LAPAROSCOPIC HERNIORRHAPHY INGUINAL Right 3/13/2017    Procedure: LAPAROSCOPIC HERNIORRHAPHY INGUINAL;  Surgeon: Africa Whittington MD;  Location: [facility]     PROCURE GRAFT FAT  2014    Procedure: PROCURE GRAFT FAT;;  Surgeon: Shalom Kebede MD;  Location: [facility]     VENTRICULOSTOMY  2014    Procedure: VENTRICULOSTOMY;;  Surgeon: Shalom Kebede MD;  Location: [facility]          Anesthesia Evaluation     . Pt has had prior anesthetic. Type: General    No history of anesthetic complications          ROS/MED HX    ENT/Pulmonary:     (+)tobacco use, Past use 0.3 packs/day  , . .    Neurologic:     (+)seizures last seizure: 10/5/2018 features: unknown, other neuro epidermoid cyst    Cardiovascular: Comment: WPW resolved after  ablation    (+) ----. : . . . :. . No previous cardiac testing       METS/Exercise Tolerance:  >4 METS   Hematologic:  - neg hematologic  ROS       Musculoskeletal:  - neg musculoskeletal ROS       GI/Hepatic:  - neg GI/hepatic ROS        Renal/Genitourinary:  - ROS Renal section negative       Endo:  - neg endo ROS       Psychiatric:  - neg psychiatric ROS       Infectious Disease:  - neg infectious disease ROS       Malignancy:      - no malignancy   Other:    (+) no H/O Chronic Pain,                       PHYSICAL EXAM:   Mental Status/Neuro: A/A/O   Airway: Facies: Feasible  Mallampati: I  Mouth/Opening: Full  TM distance: > 6 cm  Neck ROM: Full   Respiratory: Auscultation: CTAB     Resp. Rate: Normal     Resp. Effort: Normal      CV: Rhythm: Regular  Rate: Age appropriate  Heart: Normal Sounds  Edema: None   Comments: #9 and 20 flipper, #8 broken, #7 fractured      Dental: Details                LABS:  CBC:   Lab Results   Component Value Date    WBC 10.3 04/11/2014    WBC 11.5 (H) 04/10/2014    HGB 15.6 03/07/2017    HGB 14.1 04/11/2014    HCT 41.3 04/11/2014    HCT 39.5 (L) 04/10/2014     04/11/2014     04/10/2014     BMP:   Lab Results   Component Value Date     04/11/2014     04/10/2014    POTASSIUM 4.1 04/11/2014    POTASSIUM 4.0 04/10/2014    CHLORIDE 104 04/11/2014    CHLORIDE 106 04/10/2014    CO2 30 04/11/2014    CO2 27 04/10/2014    BUN 16 04/11/2014    BUN 11 04/10/2014    CR 0.86 04/11/2014    CR 0.81 04/10/2014     (H) 04/11/2014     (H) 04/10/2014     COAGS:   Lab Results   Component Value Date    PTT 30 04/07/2014    INR 1.08 04/09/2014     POC:   Lab Results   Component Value Date     (H) 04/11/2014     OTHER:   Lab Results   Component Value Date    PH 7.45 04/07/2014    LACT 1.9 04/07/2014    MARIAM 9.3 04/11/2014    PHOS 2.7 04/09/2014    MAG 2.1 04/09/2014    ALBUMIN 4.6 11/29/2012        Preop Vitals    BP Readings from Last 3 Encounters:   10/01/19 127/81   02/13/19 116/73   01/30/18 123/81    Pulse Readings from Last 3 Encounters:   10/01/19 66   02/13/19 67   01/30/18 67      Resp Readings from Last 3 Encounters:   10/01/19 16   02/13/19 16   03/13/17 18    SpO2 Readings from  Last 3 Encounters:   10/01/19 95%   02/13/19 97%   03/13/17 98%      Temp Readings from Last 1 Encounters:   10/01/19 98.1  F (36.7  C) (Oral)    Ht Readings from Last 1 Encounters:   10/01/19 1.829 m (6')      Wt Readings from Last 1 Encounters:   10/01/19 99.3 kg (219 lb)    Estimated body mass index is 29.7 kg/m  as calculated from the following:    Height as of this encounter: 1.829 m (6').    Weight as of this encounter: 99.3 kg (219 lb).     LDA:        Assessment:   ASA SCORE: 2            Plan:   Anes. Type:  General   Pre-Medication: None   Induction:  IV (Standard)   Airway: ETT; Oral   Access/Monitoring: PIV; 2nd PIV; A-Line   Maintenance: Balanced     Postop Plan:   Postop Pain: Opioids  Postop Sedation/Airway: Not planned  Disposition: Inpatient/Admit     PONV Management:   Adult Risk Factors:, Postop Opioids   Prevention: Ondansetron, Dexamethasone     CONSENT: Direct conversation   Plan and risks discussed with: Patient   Blood Products: Consented (ALL Blood Products)                PAC Discussion and Assessment    ASA Classification: 2  Case is suitable for: [place]  Anesthetic techniques and relevant risks discussed: GA  Invasive monitoring and risk discussed:   Types:   Possibility and Risk of blood transfusion discussed:   NPO instructions given:   Additional anesthetic preparation and risks discussed:   Needs early admission to pre-op area:   Other:     PAC Resident/NP Anesthesia Assessment:  Dom Valles is a 39 year old male scheduled for a Left Transzygomatic Approach For Resection Of Tumor on 10/7/2019 by Dr. Kebede in treatment of an epidermoid cyst.  PAC referral for risk assessment and optimization for anesthesia with comorbid conditions of: history of smoking, history of WPW and seizure disorder.    Pre-operative considerations:  1.  Cardiac:  Functional status- METS >4   He walks or runs regularly for exercise.  He is s/p cardioversion for WPW in 1993 with no known recurrence.  Last  "EKG noted SR.  High risk surgery with 0.4% risk of major adverse cardiac event.   2.  Pulm:  Airway feasible.  RUDY risk: low.  He quit smoking in 2012.    3.  GI:  Risk of PONV score = 2.  If > 2, anti-emetic intervention recommended.  4. Neuro: He is on keppra for history of seizure on 10/5/2018 that resulted in a la fort II fracture and SAH.  He has had no further seizures.    5. ENT:  He had facial surgery after his seizure on 1/5/2018 due to the la fort fracture from the fall he had with the seizure. He reports that he does have a \"plate\" in the left check.  Oral opening is normal.      VTE risk: 0.5%    Patient is optimized and is acceptable candidate for the proposed procedure.  No further diagnostic evaluation is needed.       **For further details of assessment, testing, and physical exam please see H and P completed on same date.          Tamara Aguirre DNP, RN, APRN      Reviewed and Signed by PAC Mid-Level Provider/Resident  Mid-Level Provider/Resident: Tamara Aguirre DNP, RN, APRN  Date: 10/1/2019  Time: 1046    Attending Anesthesiologist Anesthesia Assessment:  39 year old for left transzygomatic approach for resection of tumor (epidermoid cyst); this is revision of prior resection. Patient has recent TBI - suffered a seizure at work, fell directly onto face, [place] fx, etc. Now resolving, on Keppra for seizure prophylaxis. Patient has no significant cardiac or pulmonary disease.    Patient/case discussed with JOYCE/resident; agree with above assessment. No need to see patient. Patient is appropriate for the planned procedure without further work-up or medical management.    Makayla Burleson MD        Anesthesiologist:   Date:   Time:   Pass/Fail:   Disposition:     PAC Pharmacist Assessment:        Pharmacist:   Date:   Time:    Tamara Aguirre, CELESTE CNP  "

## 2019-10-01 NOTE — PATIENT INSTRUCTIONS
Preparing for Your Surgery      Name:  Volodymyr Valles   MRN:  [id number]   :  1980   Today's Date:  10/1/2019     Arriving for surgery:  Surgery date:  10/7/19  Arrival time:  5:30AM  Please come to:       [facility] [address]  [address] [place]  [address]    - ? parking is available in front of the hospital      -    Please proceed to [address] on the 3rd floor. [phone]?     - ?If you are in need of directions, wheelchair or escort please stop at the Information Desk in the lobby.  Inform the information person that you are here for surgery; a wheelchair and escort to [address] will be provided.?     What can I eat or drink?  -  You may have solid food or milk products until 8 hours prior to your surgery.  10/6/19, 11:30PM  -  You may have water, apple juice or 7up/Sprite until 2 hours prior to your surgery. 10/7/19, 5:30AM    Which medicines can I take?  Stop Aspirin, vitamins and supplements one week prior to surgery.  Hold Ibuprofen for 24 hours and/or Naproxen for 48 hours prior to surgery.     -  Please take these medications the day of surgery:    Keppra    How do I prepare myself?  -  Take two showers: one the night before surgery; and one the morning of surgery.         Use Scrubcare or Hibiclens to wash from neck down, leave soap on your skin for up to one minute.  Do not get soap in your eyes or ears.  You may use your own shampoo and conditioner; no other hair products.   -  Do NOT use lotion, powder, deodorant, or antiperspirant the day of your surgery.  -  Do NOT wear jewelry.  - Do not bring your own medications to the hospital, except for inhalers and eye   drops.  -  Bring your ID and insurance card.    Questions or Concerns:  -If you are scheduled on the [facility] or [facility] and have questions or concerns regarding the day of surgery, please call Preadmission Nursing at [phone].     -For questions after surgery please call your  surgeons office.           AFTER YOUR SURGERY  Breathing exercises   Breathing exercises help you recover faster. Take deep breaths and let the air out slowly. This will:     Help you wake up after surgery.    Help prevent complications like pneumonia.  Preventing complications will help you go home sooner.   We may give you a breathing device (incentive spirometer) to encourage you to breathe deeply.   Nausea and vomiting   You may feel sick to your stomach after surgery; if so, let your nurse know.    Pain control:  After surgery, you may have pain. Our goal is to help you manage your pain. Pain medicine will help you feel comfortable enough to do activities that will help you heal.  These activities may include breathing exercises, walking and physical therapy.   To help your health care team treat your pain we will ask: 1) If you have pain  2) where it is located 3) describe your pain in your words  Methods of pain control include medications given by mouth, vein or by nerve block for some surgeries.  Sequential Compression Device (SCD) or Pneumo Boots:  You may need to wear SCD S on your legs or feet. These are wraps connected to a machine that pumps in air and releases it. The repeated pumping helps prevent blood clots from forming.

## 2019-10-01 NOTE — RESULT ENCOUNTER NOTE
Tenzin Helm,    Your test results are attached.  All of your blood tests are normal and good for surgery.         Tamara Aguirre DNP, RN, ANP-C

## 2019-10-01 NOTE — H&P
Pre-Operative H & P     CC:  Preoperative exam to assess for increased cardiopulmonary risk while undergoing surgery and anesthesia.    Date of Encounter: 10/1/2019  Primary Care Physician:  Wu Clay  Associated diagnosis:  Epidermoid cyst    HPI  Volodymyr Valles is a 39 year old male who presents for pre-operative H & P in preparation for a Left Transzygomatic Approach For Resection Of Tumor on 10/7/2019 by Dr. Kebede at [facility].     Dom Valles is a 39 year old male with history of smoking, history of WPW and seizure disorder that has a residual epidermoid cyst.  He underwent partial resection and ventriculostomy in 2014.  Up until about a year ago he had been fairly asymptomatic and had chose to defer any further surgery, but on 10/5/2018 he had a seizure that caused a fall which resulted in a SAH, dental trauma and a la fort II fracture.  He has since followed up with Dr. Kebede and now the above listed surgery has been scheduled for further management.      History is obtained from the patient, his wife and the medical record.     Past Medical History  Past Medical History:   Diagnosis Date     Epidermoid cyst     brain     Seizures (H)      WPW syndrome     recurrent SVT- s/p ablation       Past Surgical History  Past Surgical History:   Procedure Laterality Date     C ABLATION SUPRAVENT ARRHYTHMOGENIC FOCUS  1993     CRANIOTOMY, RETROSIGMOID  4/7/2014    Procedure: CRANIOTOMY, RETROSIGMOID;  Left Retrosigmoid Approach, Resection Of Tumor, Ventriculostomy, Left  hip Fat [place] ;  Surgeon: Shalom Kebede MD;  [facility]      REPAIR SLIDING INGUINAL HERNIA  2003    left     HERNIORRHAPHY UMBILICAL N/A 3/13/2017    Procedure: HERNIORRHAPHY UMBILICAL;  Surgeon: Africa Whittington MD;  Location: [facility]     LAPAROSCOPIC HERNIORRHAPHY INGUINAL Right 3/13/2017    Procedure: LAPAROSCOPIC HERNIORRHAPHY INGUINAL;  Surgeon:  Africa Whittington MD;  Location: [facility]     PROCURE GRAFT FAT  2014    Procedure: PROCURE GRAFT FAT;;  Surgeon: Shalom Kebede MD;  [facility]     VENTRICULOSTOMY  2014    Procedure: VENTRICULOSTOMY;;  Surgeon: Shalom Kebede MD;  Location: [facility]       Hx of Blood transfusions/reactions: none     Hx of abnormal bleeding or anti-platelet use: none      Steroid use in the last year: none    Personal or FH with difficulty with Anesthesia:  none    Prior to Admission Medications  Current Outpatient Medications   Medication Sig Dispense Refill     Ibuprofen (ADVIL PO) Take by mouth every 6 hours as needed for moderate pain       levETIRAcetam (KEPPRA) 500 MG tablet Take 1,000 mg by mouth 2 times daily          Allergies  No Known Allergies    Social History  Social History     Socioeconomic History     Marital status:      Spouse name: Not on file     Number of children: 0     Years of education: Not on file     Highest education level: Not on file   Occupational History     Occupation: IT     Employer: [employer]     Employer: [employer]   Social Needs     Financial resource strain: Not on file     Food insecurity:     Worry: Not on file     Inability: Not on file     Transportation needs:     Medical: Not on file     Non-medical: Not on file   Tobacco Use     Smoking status: Former Smoker     Packs/day: 0.30     Types: Cigarettes     Last attempt to quit: 10/1/2012     Years since quittin.0     Smokeless tobacco: Never Used   Substance and Sexual Activity     Alcohol use: Yes     Alcohol/week: 15.0 standard drinks     Types: 15 Cans of beer per week     Drug use: No     Sexual activity: Yes     Partners: Female   Lifestyle     Physical activity:     Days per week: Not on file     Minutes per session: Not on file     Stress: Not on file   Relationships     Social connections:     Talks on phone: Not on file     Gets together: Not on file     Attends Jehovah's witness service:  Not on file     Active member of club or organization: Not on file     Attends meetings of clubs or organizations: Not on file     Relationship status: Not on file     Intimate partner violence:     Fear of current or ex partner: Not on file     Emotionally abused: Not on file     Physically abused: Not on file     Forced sexual activity: Not on file   Other Topics Concern     Parent/sibling w/ CABG, MI or angioplasty before 65F 55M? 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   ENT/Pulmonary:     (+)tobacco use, Past use 0.3 packs/day  , . .    Neurologic:     (+)seizures last seizure: 10/5/2018 features: unknown, other neuro epidermoid cyst    Cardiovascular: Comment: WPW resolved after 2013 ablation    (+) ----. : . . . :. . No previous cardiac testing       METS/Exercise Tolerance:  >4 METS   Hematologic:  - neg hematologic  ROS       Musculoskeletal:  - neg musculoskeletal ROS       GI/Hepatic:  - neg GI/hepatic ROS       Renal/Genitourinary:  - ROS Renal section negative       Endo:  - neg endo ROS       Psychiatric:  - neg psychiatric ROS       Infectious Disease:  - neg infectious disease ROS       Malignancy:      - no malignancy   Other:    (+) no H/O Chronic Pain,                       PHYSICAL EXAM:   Mental Status/Neuro: A/A/O   Airway: Facies: Feasible  Mallampati: I  Mouth/Opening: Full  TM distance: > 6 cm  Neck ROM: Full  "  Respiratory: Auscultation: CTAB     Resp. Rate: Normal     Resp. Effort: Normal      CV: Rhythm: Regular  Rate: Age appropriate  Heart: Normal Sounds  Edema: None   Comments: #9 and 20 flipper, #8 broken, #7 fractured      Dental: Details                  Temp: 98.1  F (36.7  C) Temp src: Oral BP: 127/81 Pulse: 66   Resp: 16 SpO2: 95 %         219 lbs 0 oz  6' 0\"   Body mass index is 29.7 kg/m .       Physical Exam  Constitutional: Awake, alert, cooperative, no apparent distress, and appears stated age.  Eyes: Pupils equal, round and reactive to light, extra ocular muscles intact, sclera clear, conjunctiva normal.  HENT: Normocephalic, oral pharynx with moist mucus membranes. Dentition -#9 and 20 flipper, #8 broken, #7 fractured. No goiter appreciated.   Respiratory: Clear to auscultation bilaterally, no crackles or wheezing.  Cardiovascular: Regular rate and rhythm, normal S1 and S2, and no murmur noted.  Carotids +2, no bruits. No edema. Palpable pulses to radial  DP and PT arteries.   GI: Normal bowel sounds, soft, non-distended, non-tender, no masses palpated, no hepatosplenomegaly.   Lymph/Hematologic: No cervical lymphadenopathy and no supraclavicular lymphadenopathy.  Genitourinary: deferred  Skin: Warm and dry.  No rashes at anticipated surgical site.   Musculoskeletal: Full ROM of neck. There is no redness, warmth, or swelling of the joints. Gross motor strength is normal.    Neurologic: Awake, alert, oriented to name, place and time. Cranial nerves II-XII are grossly intact. Gait is normal.   Neuropsychiatric: Calm, cooperative. Normal affect.     Labs: (personally reviewed)   Component      Latest Ref Rng & Units 10/1/2019   Sodium      133 - 144 mmol/L 139   Potassium      3.4 - 5.3 mmol/L 4.2   Chloride      94 - 109 mmol/L 106   Carbon Dioxide      20 - 32 mmol/L 30   Anion Gap      3 - 14 mmol/L 3   Glucose      70 - 99 mg/dL 81   Urea Nitrogen      7 - 30 mg/dL 14   Creatinine      0.66 - 1.25 " "mg/dL 0.94   GFR Estimate      >60 mL/min/1.73:m2 >90   GFR Estimate If Black      >60 mL/min/1.73:m2 >90   Calcium      8.5 - 10.1 mg/dL 9.3   WBC      4.0 - 11.0 10e9/L 7.4   RBC Count      4.4 - 5.9 10e12/L 5.43   Hemoglobin      13.3 - 17.7 g/dL 16.6   Hematocrit      40.0 - 53.0 % 49.4   MCV      78 - 100 fl 91   MCH      26.5 - 33.0 pg 30.6   MCHC      31.5 - 36.5 g/dL 33.6   RDW      10.0 - 15.0 % 12.4   Platelet Count      150 - 450 10e9/L 171   INR      0.86 - 1.14 0.98   PTT      22 - 37 sec 32           EKG: 10/2018  SINUS RHYTHM  LEFT VENTRICULAR HYPERTROPHY AND ST-T CHANGE  [VOLTAGE CRITERIA PLUS ST/T ABNORMALITY]  ABNORMAL ECG      Outside records reviewed from: Care Everywhere    ASSESSMENT and PLAN  Dom Valles is a 39 year old male scheduled for a Left Transzygomatic Approach For Resection Of Tumor on 10/7/2019 by Dr. Kebede in treatment of an epidermoid cyst.  PAC referral for risk assessment and optimization for anesthesia with comorbid conditions of: history of smoking, history of WPW and seizure disorder.    Pre-operative considerations:  1.  Cardiac:  Functional status- METS >4   He walks or runs regularly for exercise.  He is s/p cardioversion for WPW in 1993 with no known recurrence.  Last EKG noted SR.  High risk surgery with 0.4% risk of major adverse cardiac event.   2.  Pulm:  Airway feasible.  RUDY risk: low.  He quit smoking in 2012.    3.  GI:  Risk of PONV score = 2.  If > 2, anti-emetic intervention recommended.  4. Neuro: He is on keppra for history of seizure on 10/5/2018 that resulted in a la fort II fracture and SAH.  He has had no further seizures.    5. ENT:  He had facial surgery after his seizure on 1/5/2018 due to the la fort fracture from the fall he had with the seizure. He reports that he does have a \"plate\" in the left check.  Oral opening is normal.      VTE risk: 0.5%    Patient is optimized and is acceptable candidate for the proposed procedure.  No further " diagnostic evaluation is needed.       Tamara Aguirre DNP, RN, APRN  Preoperative Assessment Center  [facility]  Phone: [phone]  Fax: [phone]

## 2019-10-07 ENCOUNTER — ANESTHESIA (OUTPATIENT)
Dept: SURGERY | Facility: CLINIC | Age: 39
End: 2019-10-07
Payer: COMMERCIAL

## 2019-10-07 ENCOUNTER — HOSPITAL ENCOUNTER (INPATIENT)
Facility: CLINIC | Age: 39
LOS: 2 days | Discharge: HOME | End: 2019-10-09
Attending: NEUROLOGICAL SURGERY | Admitting: NEUROLOGICAL SURGERY
Payer: COMMERCIAL

## 2019-10-07 DIAGNOSIS — Z98.890 S/P CRANIOTOMY: Primary | ICD-10-CM

## 2019-10-07 LAB
ABO + RH BLD: NORMAL
ABO + RH BLD: NORMAL
ANION GAP SERPL CALCULATED.3IONS-SCNC: 11 MMOL/L (ref 3–14)
ANION GAP SERPL CALCULATED.3IONS-SCNC: 5 MMOL/L (ref 3–14)
APTT PPP: 32 SEC (ref 22–37)
BLD GP AB SCN SERPL QL: NORMAL
BLOOD BANK CMNT PATIENT-IMP: NORMAL
BUN SERPL-MCNC: 12 MG/DL (ref 7–30)
BUN SERPL-MCNC: 17 MG/DL (ref 7–30)
CALCIUM SERPL-MCNC: 8.1 MG/DL (ref 8.5–10.1)
CALCIUM SERPL-MCNC: 8.7 MG/DL (ref 8.5–10.1)
CHLORIDE SERPL-SCNC: 107 MMOL/L (ref 94–109)
CHLORIDE SERPL-SCNC: 107 MMOL/L (ref 94–109)
CO2 SERPL-SCNC: 21 MMOL/L (ref 20–32)
CO2 SERPL-SCNC: 28 MMOL/L (ref 20–32)
CREAT SERPL-MCNC: 0.88 MG/DL (ref 0.66–1.25)
CREAT SERPL-MCNC: 0.89 MG/DL (ref 0.66–1.25)
ERYTHROCYTE [DISTWIDTH] IN BLOOD BY AUTOMATED COUNT: 12.5 % (ref 10–15)
ERYTHROCYTE [DISTWIDTH] IN BLOOD BY AUTOMATED COUNT: 12.6 % (ref 10–15)
GFR SERPL CREATININE-BSD FRML MDRD: >90 ML/MIN/{1.73_M2}
GFR SERPL CREATININE-BSD FRML MDRD: >90 ML/MIN/{1.73_M2}
GLUCOSE BLDC GLUCOMTR-MCNC: 102 MG/DL (ref 70–99)
GLUCOSE BLDC GLUCOMTR-MCNC: 169 MG/DL (ref 70–99)
GLUCOSE SERPL-MCNC: 172 MG/DL (ref 70–99)
GLUCOSE SERPL-MCNC: 94 MG/DL (ref 70–99)
HCT VFR BLD AUTO: 42.7 % (ref 40–53)
HCT VFR BLD AUTO: 47.7 % (ref 40–53)
HGB BLD-MCNC: 14.8 G/DL (ref 13.3–17.7)
HGB BLD-MCNC: 15.7 G/DL (ref 13.3–17.7)
INR PPP: 1.03 (ref 0.86–1.14)
MAGNESIUM SERPL-MCNC: 1.6 MG/DL (ref 1.6–2.3)
MCH RBC QN AUTO: 30.1 PG (ref 26.5–33)
MCH RBC QN AUTO: 30.7 PG (ref 26.5–33)
MCHC RBC AUTO-ENTMCNC: 32.9 G/DL (ref 31.5–36.5)
MCHC RBC AUTO-ENTMCNC: 34.7 G/DL (ref 31.5–36.5)
MCV RBC AUTO: 89 FL (ref 78–100)
MCV RBC AUTO: 91 FL (ref 78–100)
MRSA DNA SPEC QL NAA+PROBE: NEGATIVE
PHOSPHATE SERPL-MCNC: 4.5 MG/DL (ref 2.5–4.5)
PLATELET # BLD AUTO: 160 10E9/L (ref 150–450)
PLATELET # BLD AUTO: 233 10E9/L (ref 150–450)
POTASSIUM SERPL-SCNC: 3.5 MMOL/L (ref 3.4–5.3)
POTASSIUM SERPL-SCNC: 3.9 MMOL/L (ref 3.4–5.3)
RBC # BLD AUTO: 4.82 10E12/L (ref 4.4–5.9)
RBC # BLD AUTO: 5.22 10E12/L (ref 4.4–5.9)
SODIUM SERPL-SCNC: 139 MMOL/L (ref 133–144)
SODIUM SERPL-SCNC: 140 MMOL/L (ref 133–144)
SPECIMEN EXP DATE BLD: NORMAL
SPECIMEN SOURCE: NORMAL
WBC # BLD AUTO: 19.2 10E9/L (ref 4–11)
WBC # BLD AUTO: 6 10E9/L (ref 4–11)

## 2019-10-07 PROCEDURE — 36000074 ZZH SURGERY LEVEL 6 1ST 30 MIN - UMMC: Performed by: NEUROLOGICAL SURGERY

## 2019-10-07 PROCEDURE — 25000132 ZZH RX MED GY IP 250 OP 250 PS 637

## 2019-10-07 PROCEDURE — 87640 STAPH A DNA AMP PROBE: CPT | Performed by: NEUROLOGICAL SURGERY

## 2019-10-07 PROCEDURE — 88307 TISSUE EXAM BY PATHOLOGIST: CPT | Performed by: NEUROLOGICAL SURGERY

## 2019-10-07 PROCEDURE — 87641 MR-STAPH DNA AMP PROBE: CPT | Performed by: NEUROLOGICAL SURGERY

## 2019-10-07 PROCEDURE — 00000146 ZZHCL STATISTIC GLUCOSE BY METER IP

## 2019-10-07 PROCEDURE — 25800030 ZZH RX IP 258 OP 636

## 2019-10-07 PROCEDURE — C1713 ANCHOR/SCREW BN/BN,TIS/BN: HCPCS | Performed by: NEUROLOGICAL SURGERY

## 2019-10-07 PROCEDURE — 25000125 ZZHC RX 250: Performed by: NEUROLOGICAL SURGERY

## 2019-10-07 PROCEDURE — 20000004 ZZH R&B ICU UMMC

## 2019-10-07 PROCEDURE — 37000008 ZZH ANESTHESIA TECHNICAL FEE, 1ST 30 MIN: Performed by: NEUROLOGICAL SURGERY

## 2019-10-07 PROCEDURE — 85610 PROTHROMBIN TIME: CPT | Performed by: STUDENT IN AN ORGANIZED HEALTH CARE EDUCATION/TRAINING PROGRAM

## 2019-10-07 PROCEDURE — 40000275 ZZH STATISTIC RCP TIME EA 10 MIN

## 2019-10-07 PROCEDURE — 80048 BASIC METABOLIC PNL TOTAL CA: CPT | Performed by: STUDENT IN AN ORGANIZED HEALTH CARE EDUCATION/TRAINING PROGRAM

## 2019-10-07 PROCEDURE — 71000015 ZZH RECOVERY PHASE 1 LEVEL 2 EA ADDTL HR: Performed by: NEUROLOGICAL SURGERY

## 2019-10-07 PROCEDURE — 25000128 H RX IP 250 OP 636

## 2019-10-07 PROCEDURE — 27810169 ZZH OR IMPLANT GENERAL: Performed by: NEUROLOGICAL SURGERY

## 2019-10-07 PROCEDURE — 83735 ASSAY OF MAGNESIUM: CPT | Performed by: STUDENT IN AN ORGANIZED HEALTH CARE EDUCATION/TRAINING PROGRAM

## 2019-10-07 PROCEDURE — 85730 THROMBOPLASTIN TIME PARTIAL: CPT | Performed by: STUDENT IN AN ORGANIZED HEALTH CARE EDUCATION/TRAINING PROGRAM

## 2019-10-07 PROCEDURE — 27210794 ZZH OR GENERAL SUPPLY STERILE: Performed by: NEUROLOGICAL SURGERY

## 2019-10-07 PROCEDURE — 40000014 ZZH STATISTIC ARTERIAL MONITORING DAILY

## 2019-10-07 PROCEDURE — 25000128 H RX IP 250 OP 636: Performed by: STUDENT IN AN ORGANIZED HEALTH CARE EDUCATION/TRAINING PROGRAM

## 2019-10-07 PROCEDURE — 00B70ZZ EXCISION OF CEREBRAL HEMISPHERE, OPEN APPROACH: ICD-10-PCS | Performed by: NEUROLOGICAL SURGERY

## 2019-10-07 PROCEDURE — C1763 CONN TISS, NON-HUMAN: HCPCS | Performed by: NEUROLOGICAL SURGERY

## 2019-10-07 PROCEDURE — 36000076 ZZH SURGERY LEVEL 6 EA 15 ADDTL MIN - UMMC: Performed by: NEUROLOGICAL SURGERY

## 2019-10-07 PROCEDURE — 25800030 ZZH RX IP 258 OP 636: Performed by: NEUROLOGICAL SURGERY

## 2019-10-07 PROCEDURE — 25800030 ZZH RX IP 258 OP 636: Performed by: STUDENT IN AN ORGANIZED HEALTH CARE EDUCATION/TRAINING PROGRAM

## 2019-10-07 PROCEDURE — 27211022 ZZHC OR IOM SUPPLIES OPNP: Performed by: NEUROLOGICAL SURGERY

## 2019-10-07 PROCEDURE — 25000566 ZZH SEVOFLURANE, EA 15 MIN: Performed by: NEUROLOGICAL SURGERY

## 2019-10-07 PROCEDURE — 86901 BLOOD TYPING SEROLOGIC RH(D): CPT | Performed by: STUDENT IN AN ORGANIZED HEALTH CARE EDUCATION/TRAINING PROGRAM

## 2019-10-07 PROCEDURE — 84100 ASSAY OF PHOSPHORUS: CPT | Performed by: STUDENT IN AN ORGANIZED HEALTH CARE EDUCATION/TRAINING PROGRAM

## 2019-10-07 PROCEDURE — 25000125 ZZHC RX 250: Performed by: NURSE ANESTHETIST, CERTIFIED REGISTERED

## 2019-10-07 PROCEDURE — 95940 IONM IN OPERATNG ROOM 15 MIN: CPT | Performed by: NEUROLOGICAL SURGERY

## 2019-10-07 PROCEDURE — 25000128 H RX IP 250 OP 636: Performed by: NURSE ANESTHETIST, CERTIFIED REGISTERED

## 2019-10-07 PROCEDURE — 37000009 ZZH ANESTHESIA TECHNICAL FEE, EACH ADDTL 15 MIN: Performed by: NEUROLOGICAL SURGERY

## 2019-10-07 PROCEDURE — 71000014 ZZH RECOVERY PHASE 1 LEVEL 2 FIRST HR: Performed by: NEUROLOGICAL SURGERY

## 2019-10-07 PROCEDURE — 36415 COLL VENOUS BLD VENIPUNCTURE: CPT | Performed by: STUDENT IN AN ORGANIZED HEALTH CARE EDUCATION/TRAINING PROGRAM

## 2019-10-07 PROCEDURE — 25000128 H RX IP 250 OP 636: Performed by: NEUROLOGICAL SURGERY

## 2019-10-07 PROCEDURE — 86900 BLOOD TYPING SEROLOGIC ABO: CPT | Performed by: STUDENT IN AN ORGANIZED HEALTH CARE EDUCATION/TRAINING PROGRAM

## 2019-10-07 PROCEDURE — 86850 RBC ANTIBODY SCREEN: CPT | Performed by: STUDENT IN AN ORGANIZED HEALTH CARE EDUCATION/TRAINING PROGRAM

## 2019-10-07 PROCEDURE — 25000125 ZZHC RX 250

## 2019-10-07 PROCEDURE — 40000170 ZZH STATISTIC PRE-PROCEDURE ASSESSMENT II: Performed by: NEUROLOGICAL SURGERY

## 2019-10-07 PROCEDURE — 25000132 ZZH RX MED GY IP 250 OP 250 PS 637: Performed by: STUDENT IN AN ORGANIZED HEALTH CARE EDUCATION/TRAINING PROGRAM

## 2019-10-07 PROCEDURE — 27210995 ZZH RX 272: Performed by: NEUROLOGICAL SURGERY

## 2019-10-07 PROCEDURE — 85027 COMPLETE CBC AUTOMATED: CPT | Performed by: STUDENT IN AN ORGANIZED HEALTH CARE EDUCATION/TRAINING PROGRAM

## 2019-10-07 DEVICE — GRAFT DURAGEN 3X3" ID330: Type: IMPLANTABLE DEVICE | Site: BRAIN | Status: FUNCTIONAL

## 2019-10-07 DEVICE — IMP SCR SYN MATRIX LOW PRO 1.5X04MM SELF DRILL 04.503.104.01: Type: IMPLANTABLE DEVICE | Site: BRAIN | Status: FUNCTIONAL

## 2019-10-07 DEVICE — IMP BUR HOLE COVER 17MM LOW PROFILE TI 421.527: Type: IMPLANTABLE DEVICE | Site: BRAIN | Status: FUNCTIONAL

## 2019-10-07 RX ORDER — OXYCODONE HYDROCHLORIDE 5 MG/1
5-10 TABLET ORAL
Status: DISCONTINUED | OUTPATIENT
Start: 2019-10-07 | End: 2019-10-09 | Stop reason: HOSPADM

## 2019-10-07 RX ORDER — NALOXONE HYDROCHLORIDE 0.4 MG/ML
.1-.4 INJECTION, SOLUTION INTRAMUSCULAR; INTRAVENOUS; SUBCUTANEOUS
Status: ACTIVE | OUTPATIENT
Start: 2019-10-07 | End: 2019-10-08

## 2019-10-07 RX ORDER — ONDANSETRON 4 MG/1
4-8 TABLET, ORALLY DISINTEGRATING ORAL EVERY 6 HOURS PRN
Status: DISCONTINUED | OUTPATIENT
Start: 2019-10-07 | End: 2019-10-09 | Stop reason: HOSPADM

## 2019-10-07 RX ORDER — FENTANYL CITRATE 50 UG/ML
25-50 INJECTION, SOLUTION INTRAMUSCULAR; INTRAVENOUS
Status: DISCONTINUED | OUTPATIENT
Start: 2019-10-07 | End: 2019-10-07 | Stop reason: HOSPADM

## 2019-10-07 RX ORDER — SODIUM CHLORIDE, SODIUM LACTATE, POTASSIUM CHLORIDE, CALCIUM CHLORIDE 600; 310; 30; 20 MG/100ML; MG/100ML; MG/100ML; MG/100ML
INJECTION, SOLUTION INTRAVENOUS CONTINUOUS
Status: DISCONTINUED | OUTPATIENT
Start: 2019-10-07 | End: 2019-10-07 | Stop reason: HOSPADM

## 2019-10-07 RX ORDER — LIDOCAINE HYDROCHLORIDE 20 MG/ML
INJECTION, SOLUTION INFILTRATION; PERINEURAL PRN
Status: DISCONTINUED | OUTPATIENT
Start: 2019-10-07 | End: 2019-10-07

## 2019-10-07 RX ORDER — ACETAMINOPHEN 325 MG/1
650 TABLET ORAL EVERY 4 HOURS PRN
Status: DISCONTINUED | OUTPATIENT
Start: 2019-10-10 | End: 2019-10-09 | Stop reason: HOSPADM

## 2019-10-07 RX ORDER — MANNITOL 20 G/100ML
INJECTION, SOLUTION INTRAVENOUS PRN
Status: DISCONTINUED | OUTPATIENT
Start: 2019-10-07 | End: 2019-10-07

## 2019-10-07 RX ORDER — ACETAMINOPHEN 325 MG/1
975 TABLET ORAL EVERY 8 HOURS
Status: DISCONTINUED | OUTPATIENT
Start: 2019-10-07 | End: 2019-10-09 | Stop reason: HOSPADM

## 2019-10-07 RX ORDER — LABETALOL 20 MG/4 ML (5 MG/ML) INTRAVENOUS SYRINGE
5-10
Status: COMPLETED | OUTPATIENT
Start: 2019-10-07 | End: 2019-10-07

## 2019-10-07 RX ORDER — PROCHLORPERAZINE MALEATE 10 MG
10 TABLET ORAL EVERY 6 HOURS PRN
Status: DISCONTINUED | OUTPATIENT
Start: 2019-10-07 | End: 2019-10-09 | Stop reason: HOSPADM

## 2019-10-07 RX ORDER — LEVETIRACETAM 10 MG/ML
1000 INJECTION INTRAVASCULAR ONCE
Status: COMPLETED | OUTPATIENT
Start: 2019-10-07 | End: 2019-10-07

## 2019-10-07 RX ORDER — ONDANSETRON 2 MG/ML
4 INJECTION INTRAMUSCULAR; INTRAVENOUS EVERY 30 MIN PRN
Status: DISCONTINUED | OUTPATIENT
Start: 2019-10-07 | End: 2019-10-07 | Stop reason: HOSPADM

## 2019-10-07 RX ORDER — HYDRALAZINE HYDROCHLORIDE 20 MG/ML
10-20 INJECTION INTRAMUSCULAR; INTRAVENOUS EVERY 30 MIN PRN
Status: DISCONTINUED | OUTPATIENT
Start: 2019-10-07 | End: 2019-10-08

## 2019-10-07 RX ORDER — ONDANSETRON 4 MG/1
4 TABLET, ORALLY DISINTEGRATING ORAL EVERY 30 MIN PRN
Status: DISCONTINUED | OUTPATIENT
Start: 2019-10-07 | End: 2019-10-07 | Stop reason: HOSPADM

## 2019-10-07 RX ORDER — BUPIVACAINE HYDROCHLORIDE AND EPINEPHRINE 2.5; 5 MG/ML; UG/ML
INJECTION, SOLUTION EPIDURAL; INFILTRATION; INTRACAUDAL; PERINEURAL PRN
Status: DISCONTINUED | OUTPATIENT
Start: 2019-10-07 | End: 2019-10-07 | Stop reason: HOSPADM

## 2019-10-07 RX ORDER — LEVETIRACETAM 500 MG/1
1000 TABLET ORAL 2 TIMES DAILY
Status: DISCONTINUED | OUTPATIENT
Start: 2019-10-07 | End: 2019-10-09 | Stop reason: HOSPADM

## 2019-10-07 RX ORDER — DEXAMETHASONE SODIUM PHOSPHATE 4 MG/ML
INJECTION, SOLUTION INTRA-ARTICULAR; INTRALESIONAL; INTRAMUSCULAR; INTRAVENOUS; SOFT TISSUE PRN
Status: DISCONTINUED | OUTPATIENT
Start: 2019-10-07 | End: 2019-10-07

## 2019-10-07 RX ORDER — SODIUM CHLORIDE, SODIUM LACTATE, POTASSIUM CHLORIDE, CALCIUM CHLORIDE 600; 310; 30; 20 MG/100ML; MG/100ML; MG/100ML; MG/100ML
INJECTION, SOLUTION INTRAVENOUS CONTINUOUS PRN
Status: DISCONTINUED | OUTPATIENT
Start: 2019-10-07 | End: 2019-10-07

## 2019-10-07 RX ORDER — CEFAZOLIN SODIUM 2 G/100ML
2 INJECTION, SOLUTION INTRAVENOUS
Status: COMPLETED | OUTPATIENT
Start: 2019-10-07 | End: 2019-10-07

## 2019-10-07 RX ORDER — ONDANSETRON 2 MG/ML
4-8 INJECTION INTRAMUSCULAR; INTRAVENOUS EVERY 6 HOURS PRN
Status: DISCONTINUED | OUTPATIENT
Start: 2019-10-07 | End: 2019-10-09 | Stop reason: HOSPADM

## 2019-10-07 RX ORDER — AMOXICILLIN 250 MG
1 CAPSULE ORAL 2 TIMES DAILY
Status: DISCONTINUED | OUTPATIENT
Start: 2019-10-07 | End: 2019-10-09 | Stop reason: HOSPADM

## 2019-10-07 RX ORDER — PROPOFOL 10 MG/ML
INJECTION, EMULSION INTRAVENOUS PRN
Status: DISCONTINUED | OUTPATIENT
Start: 2019-10-07 | End: 2019-10-07

## 2019-10-07 RX ORDER — HYDROMORPHONE HYDROCHLORIDE 1 MG/ML
.3-.5 INJECTION, SOLUTION INTRAMUSCULAR; INTRAVENOUS; SUBCUTANEOUS EVERY 5 MIN PRN
Status: DISCONTINUED | OUTPATIENT
Start: 2019-10-07 | End: 2019-10-07 | Stop reason: HOSPADM

## 2019-10-07 RX ORDER — AMOXICILLIN 250 MG
2 CAPSULE ORAL 2 TIMES DAILY
Status: DISCONTINUED | OUTPATIENT
Start: 2019-10-07 | End: 2019-10-09 | Stop reason: HOSPADM

## 2019-10-07 RX ORDER — ONDANSETRON 2 MG/ML
INJECTION INTRAMUSCULAR; INTRAVENOUS PRN
Status: DISCONTINUED | OUTPATIENT
Start: 2019-10-07 | End: 2019-10-07

## 2019-10-07 RX ORDER — LABETALOL 20 MG/4 ML (5 MG/ML) INTRAVENOUS SYRINGE
10-40 EVERY 10 MIN PRN
Status: DISCONTINUED | OUTPATIENT
Start: 2019-10-07 | End: 2019-10-08

## 2019-10-07 RX ORDER — SODIUM CHLORIDE 9 MG/ML
INJECTION, SOLUTION INTRAVENOUS CONTINUOUS
Status: DISCONTINUED | OUTPATIENT
Start: 2019-10-07 | End: 2019-10-09 | Stop reason: HOSPADM

## 2019-10-07 RX ORDER — LIDOCAINE 40 MG/G
CREAM TOPICAL
Status: DISCONTINUED | OUTPATIENT
Start: 2019-10-07 | End: 2019-10-09 | Stop reason: HOSPADM

## 2019-10-07 RX ORDER — NALOXONE HYDROCHLORIDE 0.4 MG/ML
.1-.4 INJECTION, SOLUTION INTRAMUSCULAR; INTRAVENOUS; SUBCUTANEOUS
Status: DISCONTINUED | OUTPATIENT
Start: 2019-10-07 | End: 2019-10-09 | Stop reason: HOSPADM

## 2019-10-07 RX ORDER — DEXMEDETOMIDINE HYDROCHLORIDE 4 UG/ML
0.2-1.2 INJECTION, SOLUTION INTRAVENOUS CONTINUOUS
Status: DISCONTINUED | OUTPATIENT
Start: 2019-10-07 | End: 2019-10-07 | Stop reason: HOSPADM

## 2019-10-07 RX ORDER — GABAPENTIN 300 MG/1
300 CAPSULE ORAL ONCE
Status: COMPLETED | OUTPATIENT
Start: 2019-10-07 | End: 2019-10-07

## 2019-10-07 RX ORDER — CEFAZOLIN SODIUM 1 G/3ML
1 INJECTION, POWDER, FOR SOLUTION INTRAMUSCULAR; INTRAVENOUS SEE ADMIN INSTRUCTIONS
Status: DISCONTINUED | OUTPATIENT
Start: 2019-10-07 | End: 2019-10-07 | Stop reason: HOSPADM

## 2019-10-07 RX ORDER — FENTANYL CITRATE 50 UG/ML
INJECTION, SOLUTION INTRAMUSCULAR; INTRAVENOUS PRN
Status: DISCONTINUED | OUTPATIENT
Start: 2019-10-07 | End: 2019-10-07

## 2019-10-07 RX ORDER — ACETAMINOPHEN 325 MG/1
975 TABLET ORAL ONCE
Status: COMPLETED | OUTPATIENT
Start: 2019-10-07 | End: 2019-10-07

## 2019-10-07 RX ADMIN — ROCURONIUM BROMIDE 10 MG: 10 INJECTION INTRAVENOUS at 15:43

## 2019-10-07 RX ADMIN — CEFAZOLIN 1 G: 1 INJECTION, POWDER, FOR SOLUTION INTRAMUSCULAR; INTRAVENOUS at 09:58

## 2019-10-07 RX ADMIN — MANNITOL 100 G: 20 INJECTION, SOLUTION INTRAVENOUS at 08:27

## 2019-10-07 RX ADMIN — CEFAZOLIN 1 G: 1 INJECTION, POWDER, FOR SOLUTION INTRAMUSCULAR; INTRAVENOUS at 15:55

## 2019-10-07 RX ADMIN — ROCURONIUM BROMIDE 20 MG: 10 INJECTION INTRAVENOUS at 13:20

## 2019-10-07 RX ADMIN — ROCURONIUM BROMIDE 40 MG: 10 INJECTION INTRAVENOUS at 08:20

## 2019-10-07 RX ADMIN — ONDANSETRON 4 MG: 2 INJECTION INTRAMUSCULAR; INTRAVENOUS at 16:15

## 2019-10-07 RX ADMIN — FENTANYL CITRATE 50 MCG: 50 INJECTION, SOLUTION INTRAMUSCULAR; INTRAVENOUS at 16:11

## 2019-10-07 RX ADMIN — LEVETIRACETAM 1000 MG: 10 INJECTION INTRAVENOUS at 19:49

## 2019-10-07 RX ADMIN — SENNOSIDES AND DOCUSATE SODIUM 1 TABLET: 8.6; 5 TABLET ORAL at 19:50

## 2019-10-07 RX ADMIN — LABETALOL 20 MG/4 ML (5 MG/ML) INTRAVENOUS SYRINGE 10 MG: at 18:06

## 2019-10-07 RX ADMIN — ROCURONIUM BROMIDE 20 MG: 10 INJECTION INTRAVENOUS at 12:33

## 2019-10-07 RX ADMIN — CEFAZOLIN 1 G: 1 INJECTION, POWDER, FOR SOLUTION INTRAMUSCULAR; INTRAVENOUS at 11:55

## 2019-10-07 RX ADMIN — ACETAMINOPHEN 975 MG: 325 TABLET, FILM COATED ORAL at 19:50

## 2019-10-07 RX ADMIN — PROPOFOL 150 MG: 10 INJECTION, EMULSION INTRAVENOUS at 07:32

## 2019-10-07 RX ADMIN — SUGAMMADEX 200 MG: 100 INJECTION, SOLUTION INTRAVENOUS at 16:35

## 2019-10-07 RX ADMIN — ROCURONIUM BROMIDE 20 MG: 10 INJECTION INTRAVENOUS at 12:03

## 2019-10-07 RX ADMIN — ROCURONIUM BROMIDE 20 MG: 10 INJECTION INTRAVENOUS at 15:10

## 2019-10-07 RX ADMIN — PROPOFOL 50 MG: 10 INJECTION, EMULSION INTRAVENOUS at 07:45

## 2019-10-07 RX ADMIN — ROCURONIUM BROMIDE 10 MG: 10 INJECTION INTRAVENOUS at 16:11

## 2019-10-07 RX ADMIN — FENTANYL CITRATE 50 MCG: 50 INJECTION, SOLUTION INTRAMUSCULAR; INTRAVENOUS at 07:45

## 2019-10-07 RX ADMIN — ROCURONIUM BROMIDE 40 MG: 10 INJECTION INTRAVENOUS at 10:07

## 2019-10-07 RX ADMIN — CEFAZOLIN 1 G: 1 INJECTION, POWDER, FOR SOLUTION INTRAMUSCULAR; INTRAVENOUS at 13:55

## 2019-10-07 RX ADMIN — FENTANYL CITRATE 25 MCG: 50 INJECTION INTRAMUSCULAR; INTRAVENOUS at 18:32

## 2019-10-07 RX ADMIN — LIDOCAINE HYDROCHLORIDE 100 MG: 20 INJECTION, SOLUTION INFILTRATION; PERINEURAL at 07:32

## 2019-10-07 RX ADMIN — OXYCODONE HYDROCHLORIDE 5 MG: 5 TABLET ORAL at 21:33

## 2019-10-07 RX ADMIN — SODIUM CHLORIDE: 9 INJECTION, SOLUTION INTRAVENOUS at 18:17

## 2019-10-07 RX ADMIN — DEXAMETHASONE SODIUM PHOSPHATE 10 MG: 4 INJECTION, SOLUTION INTRA-ARTICULAR; INTRALESIONAL; INTRAMUSCULAR; INTRAVENOUS; SOFT TISSUE at 08:43

## 2019-10-07 RX ADMIN — CEFAZOLIN SODIUM 2 G: 2 INJECTION, SOLUTION INTRAVENOUS at 07:54

## 2019-10-07 RX ADMIN — ROCURONIUM BROMIDE 20 MG: 10 INJECTION INTRAVENOUS at 07:57

## 2019-10-07 RX ADMIN — ROCURONIUM BROMIDE 20 MG: 10 INJECTION INTRAVENOUS at 13:49

## 2019-10-07 RX ADMIN — ACETAMINOPHEN 975 MG: 325 TABLET, FILM COATED ORAL at 07:13

## 2019-10-07 RX ADMIN — ROCURONIUM BROMIDE 60 MG: 10 INJECTION INTRAVENOUS at 07:32

## 2019-10-07 RX ADMIN — ROCURONIUM BROMIDE 20 MG: 10 INJECTION INTRAVENOUS at 11:35

## 2019-10-07 RX ADMIN — SODIUM CHLORIDE, POTASSIUM CHLORIDE, SODIUM LACTATE AND CALCIUM CHLORIDE: 600; 310; 30; 20 INJECTION, SOLUTION INTRAVENOUS at 07:28

## 2019-10-07 RX ADMIN — FENTANYL CITRATE 25 MCG: 50 INJECTION, SOLUTION INTRAMUSCULAR; INTRAVENOUS at 16:36

## 2019-10-07 RX ADMIN — FENTANYL CITRATE 50 MCG: 50 INJECTION, SOLUTION INTRAMUSCULAR; INTRAVENOUS at 08:54

## 2019-10-07 RX ADMIN — FENTANYL CITRATE 100 MCG: 50 INJECTION, SOLUTION INTRAMUSCULAR; INTRAVENOUS at 07:32

## 2019-10-07 RX ADMIN — ROCURONIUM BROMIDE 40 MG: 10 INJECTION INTRAVENOUS at 14:28

## 2019-10-07 RX ADMIN — ROCURONIUM BROMIDE 20 MG: 10 INJECTION INTRAVENOUS at 08:57

## 2019-10-07 RX ADMIN — ROCURONIUM BROMIDE 20 MG: 10 INJECTION INTRAVENOUS at 10:56

## 2019-10-07 RX ADMIN — GABAPENTIN 300 MG: 300 CAPSULE ORAL at 07:13

## 2019-10-07 RX ADMIN — ROCURONIUM BROMIDE 20 MG: 10 INJECTION INTRAVENOUS at 09:34

## 2019-10-07 ASSESSMENT — ACTIVITIES OF DAILY LIVING (ADL): ADLS_ACUITY_SCORE: 12

## 2019-10-07 ASSESSMENT — VISUAL ACUITY
OU: BLURRED VISION;GLASSES

## 2019-10-07 ASSESSMENT — PAIN DESCRIPTION - DESCRIPTORS: DESCRIPTORS: HEADACHE

## 2019-10-07 NOTE — OR NURSING
Dr. Leschke paged and notified of pt's new onset right-sided pronator drift. He called back and said he is not concerned at this time as the patient is still waking up from surgery and the anesthesia is slowly wearing off.

## 2019-10-07 NOTE — ANESTHESIA POSTPROCEDURE EVALUATION
Anesthesia POST Procedure Evaluation    Patient: Volodymyr Valles   MRN:     [id number] Gender:   male   Age:    39 year old :      1980        Preoperative Diagnosis: Epidermoid Cyst Of Brain   Procedure(s):  Frontal temporal Approach For Resection Of Tumor   Postop Comments: No value filed.       Anesthesia Type:  Not documented  General    Reportable Event: NO     PAIN: Uncomplicated   Sign Out status: Comfortable, Well controlled pain     PONV: No PONV   Sign Out status:  No Nausea or Vomiting     Neuro/Psych: Uneventful perioperative course   Sign Out Status: Preoperative baseline; Age appropriate mentation     Airway/Resp.: Uneventful perioperative course   Sign Out Status: Non labored breathing, age appropriate RR; Resp. Status within EXPECTED Parameters     CV: Uneventful perioperative course   Sign Out status: Appropriate BP and perfusion indices; Appropriate HR/Rhythm     Disposition:   Sign Out in:  ICU  Disposition:  ICU  Recovery Course: Recovery in ICU  Follow-Up: Not required           Last Anesthesia Record Vitals:  CRNA VITALS  10/7/2019 1629 - 10/7/2019 1729      10/7/2019             NIBP:  130/74    Ht Rate:  73    SpO2:  95 %          Last PACU Vitals:  Vitals Value Taken Time   /91 10/7/2019  5:30 PM   Temp     Pulse 79 10/7/2019  5:30 PM   Resp 20 10/7/2019  5:15 PM   SpO2 98 % 10/7/2019  5:33 PM   Temp src     NIBP     Pulse     SpO2     Resp     Temp     Ht Rate     Temp 2     Vitals shown include unvalidated device data.      Electronically Signed By: Ashli Tejeda MD, 2019, 5:34 PM

## 2019-10-07 NOTE — BRIEF OP NOTE
[facility], [place]    Brief Operative Note    Pre-operative diagnosis: Epidermoid Cyst Of Brain  Post-operative diagnosis same  Procedure: Procedure(s):  Frontal temporal Approach For Resection Of Tumor  Surgeon: Surgeon(s) and Role:     * Shalom Kebede MD - Primary     * Leschke, John M, MD - Resident - Assisting  Anesthesia: General   Estimated blood loss: 300  Drains: Aurelio-Sharif  Specimens:   ID Type Source Tests Collected by Time Destination   A : LEFT Intracranial Epidermoid Tumor Tissue Brain SURGICAL PATHOLOGY EXAM Shalom Kebede MD 10/7/2019 12:22 PM      Findings:   residual posterior fossa tumor; A1 bleeding during the case controlled with aneurysm clip.  Complications: None.  Implants:    Implant Name Type Inv. Item Serial No.  Lot No. LRB No. Used   L-Aneurysm CLip System - Standard Temporary   NONE  NONE Left 1   GRAFT DURAGEN 3X3&quot; [id number] Bone/Tissue/Biologic GRAFT DURAGEN 3X3&quot; ID-[id number]  [employer] [id number] Left 1   IMP BUR HOLE COVER 17MM LOW PROFILE .527 Metallic Hardware/[place] IMP BUR HOLE COVER 17MM LOW PROFILE .527  [employer]AT[employer] NONE Left 3   IMP PLATE MESH SYN CONTOUR [id number] MAL .017 Metallic Hardware/[place] IMP PLATE MESH SYN CONTOUR [id number] MAL .017  [employer]AT[employer] NONE Left 1   IMP SCR SYN MATRIX LOW PRO 1.5X04MM SELF DRILL [id number] Metallic Hardware/[place] IMP SCR SYN MATRIX LOW PRO 1.5X04MM SELF DRILL [phone]  [employer] NONE Left 9      Plan:  - SBP <140  - staples placed for pin site bleeding, remove in 2-3 days, sutures at 2 weeks   - no imaging   - continue home seizure medications   - pain control

## 2019-10-07 NOTE — ANESTHESIA CARE TRANSFER NOTE
Patient: Volodymyr Valles    Procedure(s):  Frontal temporal Approach For Resection Of Tumor    Diagnosis: Epidermoid Cyst Of Brain  Diagnosis Additional Information: No value filed.    Anesthesia Type:   General     Note:  Airway :Nasal Cannula  Patient transferred to:PACU  Handoff Report: Identifed the Patient, Identified the Reponsible Provider, Reviewed the pertinent medical history, Discussed the surgical course, Reviewed Intra-OP anesthesia mangement and issues during anesthesia, Set expectations for post-procedure period and Allowed opportunity for questions and acknowledgement of understanding      Vitals: (Last set prior to Anesthesia Care Transfer)    CRNA VITALS  10/7/2019 1629 - 10/7/2019 1711      10/7/2019             NIBP:  130/74    Ht Rate:  73    SpO2:  95 %                Electronically Signed By: CELESTE Martinez CRNA  October 7, 2019  5:11 PM

## 2019-10-07 NOTE — ANESTHESIA PROCEDURE NOTES
Arterial Line Procedure Note  Staff:     Anesthesiologist:  Mckenzie Hawkins MD    Resident/CRNA:  Jozef Weiss MD    Arterial line performed by resident/CRNA in presence of a teaching physician    Location: In OR After Induction  Procedure Start/Stop Times:     patient identified, IV checked, site marked, risks and benefits discussed, informed consent, monitors and equipment checked, pre-op evaluation and at physician/surgeon's request      Correct Patient: Yes      Correct Position: Yes      Correct Site: Yes      Correct Procedure: Yes      Correct Laterality:  Yes    Site Marked:  Yes  Line Placement:     Procedure:  Arterial Line    Insertion Site:  Radial    Insertion laterality:  Right    Skin Prep: Chloraprep      Patient Prep: patient draped, mask, sterile gloves, hat and hand hygiene      Local skin infiltration:  None    Ultrasound Guided?: No      Catheter size:  20 gauge, Quick cath    Cath secured with: other (comment)      Dressing:  Tegaderm    Complications:  None obvious    Arterial waveform: Yes      IBP within 10% of NIBP: Yes

## 2019-10-08 ENCOUNTER — APPOINTMENT (OUTPATIENT)
Dept: PHYSICAL THERAPY | Facility: CLINIC | Age: 39
End: 2019-10-08
Attending: STUDENT IN AN ORGANIZED HEALTH CARE EDUCATION/TRAINING PROGRAM
Payer: COMMERCIAL

## 2019-10-08 LAB
ANION GAP SERPL CALCULATED.3IONS-SCNC: 9 MMOL/L (ref 3–14)
BUN SERPL-MCNC: 12 MG/DL (ref 7–30)
CALCIUM SERPL-MCNC: 7.5 MG/DL (ref 8.5–10.1)
CHLORIDE SERPL-SCNC: 112 MMOL/L (ref 94–109)
CO2 SERPL-SCNC: 23 MMOL/L (ref 20–32)
CREAT SERPL-MCNC: 0.79 MG/DL (ref 0.66–1.25)
ERYTHROCYTE [DISTWIDTH] IN BLOOD BY AUTOMATED COUNT: 12.6 % (ref 10–15)
GFR SERPL CREATININE-BSD FRML MDRD: >90 ML/MIN/{1.73_M2}
GLUCOSE BLDC GLUCOMTR-MCNC: 114 MG/DL (ref 70–99)
GLUCOSE SERPL-MCNC: 123 MG/DL (ref 70–99)
HCT VFR BLD AUTO: 37.1 % (ref 40–53)
HGB BLD-MCNC: 12.6 G/DL (ref 13.3–17.7)
MAGNESIUM SERPL-MCNC: 1.6 MG/DL (ref 1.6–2.3)
MCH RBC QN AUTO: 30.3 PG (ref 26.5–33)
MCHC RBC AUTO-ENTMCNC: 34 G/DL (ref 31.5–36.5)
MCV RBC AUTO: 89 FL (ref 78–100)
PHOSPHATE SERPL-MCNC: 2.8 MG/DL (ref 2.5–4.5)
PLATELET # BLD AUTO: 181 10E9/L (ref 150–450)
POTASSIUM SERPL-SCNC: 3.7 MMOL/L (ref 3.4–5.3)
RBC # BLD AUTO: 4.16 10E12/L (ref 4.4–5.9)
SODIUM SERPL-SCNC: 144 MMOL/L (ref 133–144)
WBC # BLD AUTO: 14.1 10E9/L (ref 4–11)

## 2019-10-08 PROCEDURE — 97161 PT EVAL LOW COMPLEX 20 MIN: CPT | Mod: GP

## 2019-10-08 PROCEDURE — 84100 ASSAY OF PHOSPHORUS: CPT | Performed by: NEUROLOGICAL SURGERY

## 2019-10-08 PROCEDURE — 12000001 ZZH R&B MED SURG/OB UMMC

## 2019-10-08 PROCEDURE — 85027 COMPLETE CBC AUTOMATED: CPT | Performed by: NEUROLOGICAL SURGERY

## 2019-10-08 PROCEDURE — 00000146 ZZHCL STATISTIC GLUCOSE BY METER IP

## 2019-10-08 PROCEDURE — 80048 BASIC METABOLIC PNL TOTAL CA: CPT | Performed by: NEUROLOGICAL SURGERY

## 2019-10-08 PROCEDURE — 83735 ASSAY OF MAGNESIUM: CPT | Performed by: NEUROLOGICAL SURGERY

## 2019-10-08 PROCEDURE — 25000128 H RX IP 250 OP 636: Performed by: STUDENT IN AN ORGANIZED HEALTH CARE EDUCATION/TRAINING PROGRAM

## 2019-10-08 PROCEDURE — 25000132 ZZH RX MED GY IP 250 OP 250 PS 637: Performed by: STUDENT IN AN ORGANIZED HEALTH CARE EDUCATION/TRAINING PROGRAM

## 2019-10-08 PROCEDURE — 97116 GAIT TRAINING THERAPY: CPT | Mod: GP

## 2019-10-08 PROCEDURE — 25800030 ZZH RX IP 258 OP 636: Performed by: STUDENT IN AN ORGANIZED HEALTH CARE EDUCATION/TRAINING PROGRAM

## 2019-10-08 PROCEDURE — 97530 THERAPEUTIC ACTIVITIES: CPT | Mod: GP

## 2019-10-08 RX ORDER — LABETALOL 20 MG/4 ML (5 MG/ML) INTRAVENOUS SYRINGE
10-40 EVERY 10 MIN PRN
Status: DISCONTINUED | OUTPATIENT
Start: 2019-10-08 | End: 2019-10-09 | Stop reason: HOSPADM

## 2019-10-08 RX ORDER — POTASSIUM CHLORIDE 750 MG/1
20-40 TABLET, EXTENDED RELEASE ORAL
Status: DISCONTINUED | OUTPATIENT
Start: 2019-10-08 | End: 2019-10-09 | Stop reason: HOSPADM

## 2019-10-08 RX ORDER — POTASSIUM CL/LIDO/0.9 % NACL 10MEQ/0.1L
10 INTRAVENOUS SOLUTION, PIGGYBACK (ML) INTRAVENOUS
Status: DISCONTINUED | OUTPATIENT
Start: 2019-10-08 | End: 2019-10-09 | Stop reason: HOSPADM

## 2019-10-08 RX ORDER — POTASSIUM CHLORIDE 29.8 MG/ML
20 INJECTION INTRAVENOUS
Status: DISCONTINUED | OUTPATIENT
Start: 2019-10-08 | End: 2019-10-09 | Stop reason: HOSPADM

## 2019-10-08 RX ORDER — HYDRALAZINE HYDROCHLORIDE 20 MG/ML
10-20 INJECTION INTRAMUSCULAR; INTRAVENOUS EVERY 30 MIN PRN
Status: DISCONTINUED | OUTPATIENT
Start: 2019-10-08 | End: 2019-10-09 | Stop reason: HOSPADM

## 2019-10-08 RX ORDER — POTASSIUM CHLORIDE 1.5 G/1.58G
20-40 POWDER, FOR SOLUTION ORAL
Status: DISCONTINUED | OUTPATIENT
Start: 2019-10-08 | End: 2019-10-09 | Stop reason: HOSPADM

## 2019-10-08 RX ORDER — POTASSIUM CHLORIDE 7.45 MG/ML
10 INJECTION INTRAVENOUS
Status: DISCONTINUED | OUTPATIENT
Start: 2019-10-08 | End: 2019-10-09 | Stop reason: HOSPADM

## 2019-10-08 RX ORDER — HYDROMORPHONE HYDROCHLORIDE 1 MG/ML
0.5 INJECTION, SOLUTION INTRAMUSCULAR; INTRAVENOUS; SUBCUTANEOUS ONCE
Status: COMPLETED | OUTPATIENT
Start: 2019-10-08 | End: 2019-10-08

## 2019-10-08 RX ORDER — MAGNESIUM SULFATE HEPTAHYDRATE 40 MG/ML
4 INJECTION, SOLUTION INTRAVENOUS EVERY 4 HOURS PRN
Status: DISCONTINUED | OUTPATIENT
Start: 2019-10-08 | End: 2019-10-09 | Stop reason: HOSPADM

## 2019-10-08 RX ADMIN — PROCHLORPERAZINE EDISYLATE 10 MG: 5 INJECTION INTRAMUSCULAR; INTRAVENOUS at 10:59

## 2019-10-08 RX ADMIN — ONDANSETRON HYDROCHLORIDE 4 MG: 2 INJECTION, SOLUTION INTRAMUSCULAR; INTRAVENOUS at 01:05

## 2019-10-08 RX ADMIN — SODIUM CHLORIDE: 9 INJECTION, SOLUTION INTRAVENOUS at 21:24

## 2019-10-08 RX ADMIN — ACETAMINOPHEN 975 MG: 325 TABLET, FILM COATED ORAL at 19:05

## 2019-10-08 RX ADMIN — PROCHLORPERAZINE EDISYLATE 10 MG: 5 INJECTION INTRAMUSCULAR; INTRAVENOUS at 18:11

## 2019-10-08 RX ADMIN — OXYCODONE HYDROCHLORIDE 5 MG: 5 TABLET ORAL at 10:59

## 2019-10-08 RX ADMIN — OXYCODONE HYDROCHLORIDE 5 MG: 5 TABLET ORAL at 17:46

## 2019-10-08 RX ADMIN — SODIUM CHLORIDE: 9 INJECTION, SOLUTION INTRAVENOUS at 06:51

## 2019-10-08 RX ADMIN — ONDANSETRON HYDROCHLORIDE 8 MG: 2 INJECTION, SOLUTION INTRAMUSCULAR; INTRAVENOUS at 07:42

## 2019-10-08 RX ADMIN — ACETAMINOPHEN 975 MG: 325 TABLET, FILM COATED ORAL at 10:59

## 2019-10-08 RX ADMIN — LEVETIRACETAM 1000 MG: 500 TABLET ORAL at 07:45

## 2019-10-08 RX ADMIN — OXYCODONE HYDROCHLORIDE 10 MG: 5 TABLET ORAL at 04:28

## 2019-10-08 RX ADMIN — SENNOSIDES AND DOCUSATE SODIUM 1 TABLET: 8.6; 5 TABLET ORAL at 07:45

## 2019-10-08 RX ADMIN — HYDROMORPHONE HYDROCHLORIDE 0.5 MG: 1 INJECTION, SOLUTION INTRAMUSCULAR; INTRAVENOUS; SUBCUTANEOUS at 01:17

## 2019-10-08 RX ADMIN — LEVETIRACETAM 1000 MG: 500 TABLET ORAL at 19:47

## 2019-10-08 RX ADMIN — OXYCODONE HYDROCHLORIDE 5 MG: 5 TABLET ORAL at 09:56

## 2019-10-08 RX ADMIN — OXYCODONE HYDROCHLORIDE 5 MG: 5 TABLET ORAL at 17:52

## 2019-10-08 RX ADMIN — SENNOSIDES AND DOCUSATE SODIUM 2 TABLET: 8.6; 5 TABLET ORAL at 19:47

## 2019-10-08 ASSESSMENT — ACTIVITIES OF DAILY LIVING (ADL)
ADLS_ACUITY_SCORE: 9
ADLS_ACUITY_SCORE: 11
ADLS_ACUITY_SCORE: 11
ADLS_ACUITY_SCORE: 12
ADLS_ACUITY_SCORE: 12
ADLS_ACUITY_SCORE: 11

## 2019-10-08 ASSESSMENT — VISUAL ACUITY
OU: BLURRED VISION;GLASSES

## 2019-10-08 ASSESSMENT — PAIN DESCRIPTION - DESCRIPTORS
DESCRIPTORS: HEADACHE

## 2019-10-08 NOTE — PROGRESS NOTES
S: Complaining of moderate headache.  Otherwise feels well.    O:  Exam:  General: Awake;  Alert, In No Acute Distress  Pulm: Breathing Comfortably on room air  Mental status: Oriented x 3  Cranial Nerves: Cranial Nerves II-XII Intact Bilaterally  Strength:      Del Tr Bi WE WF Gr  R 5 5 5 5 5 5  L 5 5 5 5 5 5     HF KE KF DF PF   R 5 5 5 5 5   L 5 5 5 5 5     Pronator Drift: Absent  Sensory: Intact to Light Touch  Reflexes: No Hyperreflexia, [place] s or Clonus Present; Toes Down-Going Bilaterally  INCISION: Clean, Dry and Intact with Surgical Dressing     Assessment: Mr. Valles is a 39 year old man with known history of Epidermoid Cyst s/p Left Pterional Craniotomy for Epidermoid Cyst Resection. Doing well post-operatively.      Plan:     Neuro:   Anti-epileptics: Continue home Keppra 1G Q 12 HR  Sutures out: 10/21/2019   Multimodal Post-Operative Pain Management     Cardiovascular:   Maintain SBP < 140 mmHg    Pulmonary:   Wean Supplemental Oxygen  Encourge Use of Incentive Spirometry     Gastrointestinal:   Advance to Regular Diet  Bowel Regimen   Anti-Emetics As Needed     Renal:   Remove Fitzgerald Catheter     Heme:   Maintain Hemoglobin > 8; Platelets > 100K; Fibrinogen > 200    Endocrine:   No issue    Infectious:   Monitor for Fever    PT/OT: Evaluations Pending     DVT prophylaxis: Sequential compression devices to Bilateral Lower Extremities    Ulcer prophylaxis: Not Indicated    DISPO:  4A; Possible Transfer to Unit 6A    Barriers: evaluation by therapies, ambulating, BM/flatus, voiding without a Fitzgerald, tolerating PO diet, pain controlled on PO medications      Toby Sanchez M.D.  Neurosurgery Resident, PGY-2    Please contact neurosurgery resident on call with questions.    Dial * * [phone], enter [phone] when prompted.     I have reviewed the history above and agree with the resident's assessment and plan.  CLARISSA Kebede MD

## 2019-10-08 NOTE — PLAN OF CARE
Neuro: Q 1 hr neuro checks. Pt intact, c/o blurry vision, wears glasses at baseline.   CV: Sinus rhythm, HR 70's-80's with no ectopy. SBP goal <140, no interventions needed during shift.  Pulm: titrated to room air, EtCo2 32-42. Lungs clear  GI:  CLD advanced to regular. Pt had emesis x1 during night after drinking large amount of water. Zofran given with good effect. No BM during shift.  : germain taken out at 0600. Pt voided after germain removed. Adequate urine output  Skin: no concerns with skin. Head incision covered with primapore dressing. Staple removed by MD this AM  Pain: oxycodone given x2 during shift. Dilaudid given x1 when unable to take PO meds d/t emesis. Scheduled tylenol given.  Gtts: NS @ 75  See flowsheets for further documentation.    Plan: continue to monitor, notify MD of acute changes in exam. Anticipate 6A orders and transfer when bed available

## 2019-10-08 NOTE — PLAN OF CARE
OT 4A: Evaluation orders received. Per discussion with PT, pt with no immediate OT needs at this time. Will complete orders

## 2019-10-08 NOTE — PROGRESS NOTES
10/08/19 1410   Quick Adds   Type of Visit Initial PT Evaluation       Present no   Language English   Living Environment   Lives With spouse;child(santhosh), dependent   Living Arrangements house   Home Accessibility stairs to enter home;stairs within home   Number of Stairs, Main Entrance 2   Number of Stairs, Within Home, Primary 10   Stair Railings, Within Home, Primary railings safe and in good condition   Transportation Anticipated car, drives self   Living Environment Comment Patient lives in home, wife present 24/7.  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    Self-Care   Usual Activity Tolerance good   Current Activity Tolerance good   Regular Exercise Yes   Activity/Exercise Type walking   Exercise Amount/Frequency 3-5 times/wk   Equipment Currently Used at Home none   Functional Level Prior   Ambulation 0-->independent   Transferring 0-->independent   Toileting 0-->independent   Bathing 0-->independent   Communication 0-->understands/communicates without difficulty   Swallowing 0-->swallows foods/liquids without difficulty   Cognition 0 - no cognition issues reported   Fall history within last six months no   Which of the above functional risks had a recent onset or change? none   General Information   Onset of Illness/Injury or Date of Surgery - Date 10/07/19   Referring Physician Jessi Willson MD   Pertinent History of Current Problem (include personal factors and/or comorbidities that impact the POC) Volodymyr Valles is a 39 year old male who presents for pre-operative H & P in preparation for a Left Transzygomatic Approach For Resection Of Tumor on 10/7/2019 by Dr. Kebede at [facility].    Precautions/Limitations   (craniotomy precautions)   Weight-Bearing Status - LUE partial weight-bearing (% in comments)  (10#)   Weight-Bearing Status - RUE partial weight-bearing (% in comments)  (10#)   Weight-Bearing Status - LLE full  "weight-bearing   Weight-Bearing Status - RLE full weight-bearing   Cognitive Status Examination   Orientation orientation to person, place and time   Level of Consciousness alert   Follows Commands and Answers Questions 100% of the time   Personal Safety and Judgment intact   Memory intact   Pain Assessment   Patient Currently in Pain Yes, see Vital Sign flowsheet   Integumentary/Edema   Integumentary/Edema Comments right craniotomy incision covered with bandage   Posture    Posture Forward head position;Protracted shoulders   Range of Motion (ROM)   ROM Comment B LE WNL/WFL   Strength   Strength Comments B LE > 3/5 secondary to functional mobility   Bed Mobility   Bed Mobility Comments Independent   Transfer Skills   Transfer Comments Independent   Gait   Gait Comments Independent   Balance   Balance no deficits were identified   Sensory Examination   Sensory Perception no deficits were identified   Coordination   Coordination no deficits were identified   Muscle Tone   Muscle Tone no deficits were identified   Clinical Impression   Criteria for Skilled Therapeutic Intervention yes, treatment indicated   PT Diagnosis Impaired functional mobility   Influenced by the following impairments pain, LE strength   Functional limitations due to impairments Impaired gait, stairs   Clinical Presentation Stable/Uncomplicated   Clinical Presentation Rationale clinical judgement; current mobility   Clinical Decision Making (Complexity) Low complexity   Therapy Frequency   (1x eval and treat)   Predicted Duration of Therapy Intervention (days/wks) 10/8/19   Anticipated Discharge Disposition Home   Risk & Benefits of therapy have been explained Yes   Patient, Family & other staff in agreement with plan of care Yes   [facility] PlayWith-PAC TM \"6 Clicks\"   2016, Trustees of [facility], under license to [employer].  All rights reserved.   6 Clicks Short Forms Basic Mobility Inpatient Short Form   [facility] AM-PAC  " "\"6 Clicks\" V.2 Basic Mobility Inpatient Short Form   1. Turning from your back to your side while in a flat bed without using bedrails? 4 - None   2. Moving from lying on your back to sitting on the side of a flat bed without using bedrails? 4 - None   3. Moving to and from a bed to a chair (including a wheelchair)? 4 - None   4. Standing up from a chair using your arms (e.g., wheelchair, or bedside chair)? 4 - None   5. To walk in hospital room? 4 - None   6. Climbing 3-5 steps with a railing? 4 - None   Basic Mobility Raw Score (Score out of 24.Lower scores equate to lower levels of function) 24   Total Evaluation Time   Total Evaluation Time (Minutes) 6     "

## 2019-10-08 NOTE — PROGRESS NOTES
N: Alert, oriented X4. No neuro deficits. Pupils equal and reactive - vision baseline with glasses. Moves all extremities to command with strength. Denies numbness/tingling. Ongoing headache (left incisional), relieved with PRN oxy and scheduled tylenol.   C: NSR, no ectopy noted. SBP < 140 without intervention. Pulses palpable. Afebrile.   R: RA. Lungs clear. Good, non productive cough.   GI: Regular diet. Nausea this morning relieved with PRN zofran and compazine - now tolerating fluids and food. Scheduled senna given, no BM this shift. Denies abdominal discomfort.   : Voiding without difficulty.   SKIN: Left head incision dressing CDI. DAVID with decreasing bloody output.   ACCESS: PIV X2, saline locked  ACTIVITY: Independently repositions in bed. SBA with ambulation.    Report given to 6A RNTamara (bedside neuro handoff with Sol). Pt transferred via wheelchair with belongings (including cell phone). Wife present for transfer.

## 2019-10-08 NOTE — PLAN OF CARE
PT / 4A - One Time PT evaluation and treatment session.     Discharge Planner PT   Patient plan for discharge: home  Current status: PT evaluation completed.  Reporting independence with bed mobility early this day.  Demonstrating sitting>standing transfer independently. Ambulated entire 4A unit with no assistive device and no overt LOB.  Navigated 13 stairs with 1 handrail.   Barriers to return to prior living situation: no PT barriers  Recommendations for discharge: home   Rationale for recommendations: Volodymyr is demonstrating all functional mobility safely.  With independent walking and home exercise program that Volodymyr was completing before admission, anticipate he will return to [facility].        Entered by: Huma Regan 10/08/2019 4:12 PM       This patient has been prescribed a hospital activity program to prevent the side effects of bedrest and to increase overall independence.  This program is to be performed outside of scheduled therapy sessions:  - Ambulate 3-5x/day with family members SBA.   - Sit up in chair 3-5x/day        Physical Therapy Discharge Summary    Reason for therapy discharge:    All goals and outcomes met, no further needs identified.    Progress towards therapy goal(s). See goals on Care Plan in [facility] electronic health record for goal details.  Goals met    Therapy recommendation(s):    Continue home exercise program.

## 2019-10-09 ENCOUNTER — PATIENT OUTREACH (OUTPATIENT)
Dept: CARE COORDINATION | Facility: CLINIC | Age: 39
End: 2019-10-09

## 2019-10-09 VITALS
HEART RATE: 79 BPM | DIASTOLIC BLOOD PRESSURE: 88 MMHG | OXYGEN SATURATION: 95 % | RESPIRATION RATE: 16 BRPM | TEMPERATURE: 98.2 F | SYSTOLIC BLOOD PRESSURE: 143 MMHG

## 2019-10-09 LAB
ANION GAP SERPL CALCULATED.3IONS-SCNC: 3 MMOL/L (ref 3–14)
BUN SERPL-MCNC: 8 MG/DL (ref 7–30)
CALCIUM SERPL-MCNC: 8 MG/DL (ref 8.5–10.1)
CHLORIDE SERPL-SCNC: 117 MMOL/L (ref 94–109)
CO2 SERPL-SCNC: 26 MMOL/L (ref 20–32)
COPATH REPORT: NORMAL
CREAT SERPL-MCNC: 0.8 MG/DL (ref 0.66–1.25)
ERYTHROCYTE [DISTWIDTH] IN BLOOD BY AUTOMATED COUNT: 12.3 % (ref 10–15)
GFR SERPL CREATININE-BSD FRML MDRD: >90 ML/MIN/{1.73_M2}
GLUCOSE SERPL-MCNC: 113 MG/DL (ref 70–99)
HCT VFR BLD AUTO: 33.4 % (ref 40–53)
HGB BLD-MCNC: 11.1 G/DL (ref 13.3–17.7)
MCH RBC QN AUTO: 30.6 PG (ref 26.5–33)
MCHC RBC AUTO-ENTMCNC: 33.2 G/DL (ref 31.5–36.5)
MCV RBC AUTO: 92 FL (ref 78–100)
PLATELET # BLD AUTO: 147 10E9/L (ref 150–450)
POTASSIUM SERPL-SCNC: 3.8 MMOL/L (ref 3.4–5.3)
RBC # BLD AUTO: 3.63 10E12/L (ref 4.4–5.9)
SODIUM SERPL-SCNC: 145 MMOL/L (ref 133–144)
WBC # BLD AUTO: 10.2 10E9/L (ref 4–11)

## 2019-10-09 PROCEDURE — 85027 COMPLETE CBC AUTOMATED: CPT | Performed by: NEUROLOGICAL SURGERY

## 2019-10-09 PROCEDURE — 36415 COLL VENOUS BLD VENIPUNCTURE: CPT | Performed by: NEUROLOGICAL SURGERY

## 2019-10-09 PROCEDURE — 25800030 ZZH RX IP 258 OP 636: Performed by: STUDENT IN AN ORGANIZED HEALTH CARE EDUCATION/TRAINING PROGRAM

## 2019-10-09 PROCEDURE — 80048 BASIC METABOLIC PNL TOTAL CA: CPT | Performed by: NEUROLOGICAL SURGERY

## 2019-10-09 PROCEDURE — 25000132 ZZH RX MED GY IP 250 OP 250 PS 637: Performed by: STUDENT IN AN ORGANIZED HEALTH CARE EDUCATION/TRAINING PROGRAM

## 2019-10-09 PROCEDURE — 90686 IIV4 VACC NO PRSV 0.5 ML IM: CPT | Performed by: NEUROLOGICAL SURGERY

## 2019-10-09 PROCEDURE — 25000128 H RX IP 250 OP 636: Performed by: STUDENT IN AN ORGANIZED HEALTH CARE EDUCATION/TRAINING PROGRAM

## 2019-10-09 PROCEDURE — 25000128 H RX IP 250 OP 636: Performed by: NEUROLOGICAL SURGERY

## 2019-10-09 RX ORDER — IBUPROFEN 200 MG
200 TABLET ORAL EVERY 6 HOURS PRN
COMMUNITY
Start: 2019-10-10 | End: 2023-06-08

## 2019-10-09 RX ADMIN — ONDANSETRON HYDROCHLORIDE 4 MG: 2 INJECTION, SOLUTION INTRAMUSCULAR; INTRAVENOUS at 07:34

## 2019-10-09 RX ADMIN — ACETAMINOPHEN 975 MG: 325 TABLET, FILM COATED ORAL at 12:08

## 2019-10-09 RX ADMIN — SODIUM CHLORIDE: 9 INJECTION, SOLUTION INTRAVENOUS at 09:07

## 2019-10-09 RX ADMIN — LEVETIRACETAM 1000 MG: 500 TABLET ORAL at 07:34

## 2019-10-09 RX ADMIN — INFLUENZA A VIRUS A/BRISBANE/02/2018 IVR-190 (H1N1) ANTIGEN (FORMALDEHYDE INACTIVATED), INFLUENZA A VIRUS A/KANSAS/14/2017 X-327 (H3N2) ANTIGEN (FORMALDEHYDE INACTIVATED), INFLUENZA B VIRUS B/PHUKET/3073/2013 ANTIGEN (FORMALDEHYDE INACTIVATED), AND INFLUENZA B VIRUS B/MARYLAND/15/2016 BX-69A ANTIGEN (FORMALDEHYDE INACTIVATED) 0.5 ML: 15; 15; 15; 15 INJECTION, SUSPENSION INTRAMUSCULAR at 12:10

## 2019-10-09 RX ADMIN — SENNOSIDES AND DOCUSATE SODIUM 2 TABLET: 8.6; 5 TABLET ORAL at 07:34

## 2019-10-09 RX ADMIN — ACETAMINOPHEN 975 MG: 325 TABLET, FILM COATED ORAL at 04:33

## 2019-10-09 ASSESSMENT — ACTIVITIES OF DAILY LIVING (ADL)
ADLS_ACUITY_SCORE: 9

## 2019-10-09 NOTE — PLAN OF CARE
Status: Pt transferred from  at 1630. POD #1 Left Pterional Craniotomy for Epidermoid Cyst Resection  Vitals: VSS on RA  Neuros: Intact; pt states his vision is only blurry when not wearing his glasses. Strengths 4/5 throughout.   IV: PIV-SL other running MIVF at 75cc/hr  Resp/trach:LS clear and equal on RA  Diet: On a reg diet w/poor intake.   Bowel status: No BM this shift. BS+. Pt c/o nausea; controlled w/PRN compazine x1.   : Voiding spont via urinal  Skin: Crani inc covered w/dressing; CDI. JPx1 to the site w/sanguinous drainage; 22mL out this shift  Pain: C/o HA, controlled w/shcheduled tylenol and PRN oxy x1  Activity: Up w/1A and GB. Pt declined getting OOB this shift  Social:Wife at bedside; supportive in cares  Plan: Continue to monitor and follow POC

## 2019-10-09 NOTE — PLAN OF CARE
Status: POD #2 Left Pterional Craniotomy for Epidermoid Cyst Resection  Vitals: VSS on RA  Neuros: A&O x4. Blurry vision w/o his glasses. Strengths 4/5 throughout.   IV: 2 PIV -  R PIV SL, L PIV infusing with NS at 75mL/hr  Resp: LS clear  Diet: Please provide antiemetic medication before meals. Regular diet w/poor intake d/t nausea per report   Bowel status: No BM this shift. BS +. No c/o of nausea this shift   : Voiding spont via urinal  Skin: Crani incision covered w/ dressing, CDI. Left head DAVID x1 to thumbprint with bloody bright drainage, 15mL output, Do Not Strip per order  Pain: No c/o of pain with scheduled tylenol   Activity: Up w/ A1 and GB. No OOB overnight  Social: Wife at bedside; supportive in cares  Plan: Continue to monitor and follow POC

## 2019-10-09 NOTE — PROGRESS NOTES
S: Feels well.  No complaints.    O:  Exam:  General: Awake;  Alert, In No Acute Distress  Pulm: Breathing Comfortably on room air  Mental status: Oriented x 3  Cranial Nerves: Cranial Nerves II-XII Intact Bilaterally  Strength:      Del Tr Bi WE WF Gr  R 5 5 5 5 5 5  L 5 5 5 5 5 5     HF KE KF DF PF   R 5 5 5 5 5   L 5 5 5 5 5     Pronator Drift: Absent  Sensory: Intact to Light Touch  Reflexes: No Hyperreflexia, [place] s or Clonus Present; Toes Down-Going Bilaterally  INCISION: Clean, Dry and Intact with Surgical Dressing     Assessment: Mr. Valles is a 39 year old male with known history of Epidermoid Cyst s/p Left Pterional Craniotomy for Epidermoid Cyst Resection. Doing well post-operatively.      Plan:     Neuro:   Anti-epileptics: Continue home Keppra 1G Q 12 HR  Sutures out: 10/21/2019   Multimodal Post-Operative Pain Management     Cardiovascular:   Maintain SBP < 140 mmHg    Pulmonary:   Wean Supplemental Oxygen  Encourge Use of Incentive Spirometry     Gastrointestinal:   Advance to Regular Diet  Bowel Regimen   Anti-Emetics As Needed     Renal:   No issues    Heme:   Maintain Hemoglobin > 8; Platelets > 100K; Fibrinogen > 200    Endocrine:   No issue    Infectious:   Monitor for Fever    PT: home. OT: deferred.    DVT prophylaxis: Sequential compression devices to Bilateral Lower Extremities    Ulcer prophylaxis: Not Indicated    DISPO:  6A    Barriers: none      Toby Sanchez M.D.  Neurosurgery Resident, PGY-2    Please contact neurosurgery resident on call with questions.    Dial * * [phone], enter [phone] when prompted.     I have reviewed the history above and agree with the resident's assessment and plan.  CLARISSA Kebede MD

## 2019-10-09 NOTE — DISCHARGE SUMMARY
Status: POD #2 Left Pterional Craniotomy for Epidermoid Cyst Resection  Vitals: BP (!) 143/88 (BP Location: Right arm)   Pulse 79   Temp 98.2  F (36.8  C) (Oral)   Resp 16   SpO2 95%     Neuros: A&O x4. Blurry vision w/o his glasses. Strengths 5/5 throughout.   IV: Removed. Site secured with gauze. Dressing CDI  Resp: LS clear  Diet: Poor intake d/t nausea. Advice to take small, frequent meals. Recommended BRAT diet with nutritional drinks in between  Bowel status: BM +4. No BM this shift. Passing flatus  : Voiding spont via urinal  Skin: Crani incision JUANY. Instructed to keep dry. And to pat dry if becomes wet  Pain: No c/o of pain with scheduled tylenol   Activity: Independent  Social: Wife present  Discharge: with wheelchair and wife, patient took all belongings and evacuated he unit around 1300        All questions and concerns addressed.

## 2019-10-09 NOTE — DISCHARGE SUMMARY
[facility] Discharge Summary and Instructions    Volodymyr Valles MRN# [id number]   Age: 39 year old YOB: 1980     Date of Admission:  10/7/2019  Date of Discharge::  10/9/2019 12:57 PM  Admitting Physician:  Shalom Kebede MD  Discharge Physician:  Shalom Kebede MD          Admission Diagnoses:   Epidermoid Cyst Of Brain  S/P craniotomy          Discharge Diagnosis:   Epidermoid Cyst Of Brain  S/P craniotomy          Procedures:   10/7/19: Left frontotemporal craniotomy and resection of epidermoid cyst           Brief History of Illness:   Mr. Valles is a 39-year-old man well known to our service.  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  He underwent a left retrosigmoid approach for resection of the posterior fossa component about 5 years ago.  The supratentorial component has grown slightly over the years and he recently developed a first-time seizure.  He presents for resection of the supratentorial component.            Hospital Course:   Patient underwent above-mentioned procedure on 10/7/19. The operation was uncomplicated and he was admitted to the surgical ICU for routine post operative cares. On post operative day 1, he was doing well and transferred to the floor. On post operative day 2, he was ambulating, voiding without a germain, eating a regular diet, pain was well controlled and therefore he was discharged home.          Discharge Medications:     Current Discharge Medication List      CONTINUE these medications which have CHANGED    Details   ibuprofen (ADVIL) 200 MG tablet Take 1 tablet (200 mg) by mouth every 6 hours as needed for moderate pain    Comments: Can restart ibuprofen on 10/10/19         CONTINUE these medications which have NOT CHANGED    Details   levETIRAcetam (KEPPRA) 500 MG tablet Take 1,000 mg by mouth 2 times daily                       Discharge Instructions and Follow-Up:   Discharge diet: Regular   Discharge activity: You may advance activity as tolerated. No strenuous exercise or heay lifting greater than 10 lbs for 4 weeks or until seen and cleared in clinic.   Discharge follow-up: Follow-up with nurse practitioner in 2 weeks for wound check.   Follow-up with Dr. Kebede in 1 month.   Wound care: Ok to shower,however no scrubbing of the wound and no soaking of the wound, meaning no bathtubs or swimming pools. Pat dry only. Leave wound open to air.  Sutures are not absorbable and need to be removed in 2 weeks. If patient still at rehab by this time, the sutures may be removed by the rehab physician if he or she considers that the wound has healed completely.     Please call if you have:  1. increased pain, redness, drainage, swelling at your incision  2. fevers > 101.5 F degrees  3. with any questions or concerns.  You may reach the Neurosurgery clinic at [phone] during regular work hours. ER at [phone].    and ask for the Neurosurgery Resident on call at [phone], during off hours or weekends.         Discharge Disposition:   Discharged to home          -----------------------------------  Jessi Willson MD, MS  Neurosurgery PGY-2  Pager #[phone]

## 2019-10-10 ENCOUNTER — TELEPHONE (OUTPATIENT)
Dept: NEUROSURGERY | Facility: CLINIC | Age: 39
End: 2019-10-10

## 2019-10-11 ENCOUNTER — TELEPHONE (OUTPATIENT)
Dept: NEUROSURGERY | Facility: CLINIC | Age: 39
End: 2019-10-11

## 2019-10-11 NOTE — PROGRESS NOTES
Patient was called three times and no answer so post 24 hr DC follow up calls will be closed out, message was left with contact number for department seen by or following up     After discharge, your activity restrictions are:  -We encourage short frequent walks, increasing as tolerated.  - No driving until you are seen in clinic and cleared by your neurosurgeon. If you have had a seizure, you may  not drive for at least 3 months according to [place] law.  - No strenuous activity.  - No lifting more than 10 pounds until you are seen in clinic (a gallon of milk weighs approximately 8 pounds)  Wound cares after surgery  - You are ok to shower, but do not soak your incisions. Pat them dry if they get damp.  - No baths, hot tubs or pools for 4-6 weeks after surgery.  - No strenuous activity.  - No lifting more than 10 pounds until you are seen in clinic (a gallon of milk weighs approximately 8 pounds)  - You are ok to shower, but do not soak your incisions. Pat them dry if they get damp.  - No baths, hot tubs or pools for 4-6 weeks after surgery.  Call if you have any of the followin. Temperature greater than 101.5 F.  2. Any redness, swelling or discharge from the wound.  3. Any new weakness, numbness or altered mental status.  4. Worsening pain that is not improving with the pain medications you were prescribed.  Call [phone] or after 5:00 pm or on weekends call [phone] and ask for the neurosurgery resident  on call. Thank You.

## 2019-10-11 NOTE — OP NOTE
Procedure Date: 10/07/2019      PREOPERATIVE DIAGNOSES:     1.  Large supratentorial epidermoid.   2.  Temporal lobe compression secondary to #1.      POSTOPERATIVE DIAGNOSES:   1.  Large supratentorial epidermoid.   2.  Temporal lobe compression secondary to #1.      TITLE OF PROCEDURES:   1.  Left frontotemporal craniotomy.   2.  Resection of supratentorial epidermoid.   3.  Intraoperative use of microscope.      ANESTHESIA:  GETA.      ATTENDING SURGEON:  Shalom Kebede MD      RESIDENT SURGEON:  John Leschke, MD      HISTORY OF PRESENT ILLNESS:  Mr. Valles is a 39-year-old man well known to our service.  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  He underwent a left retrosigmoid approach for resection of the posterior fossa component about 5 years ago.  The supratentorial component has grown slightly over the years and he recently developed a first-time seizure.  He presents for resection of the supratentorial component.  This component involves multiple cisterns and is also extending along the basal temporal lobe on the left side.  There is a posterior tentorial incisural component, which is not the focus for this operation.      DESCRIPTION OF PROCEDURE:  Upon intubation and induction of general anesthesia, the patient was placed in supine position on the operating table.  Malone head del cid was applied and his head was rotated about 30 degrees to the right and his neck was extended.  After securing his head in this position, we planned a standard left frontotemporal scalp incision, just behind his hairline.  Electrodes for SSEP and EEG monitoring were established by the [facility] staff and baseline recordings were obtained.  This scalp was prepared and draped in the usual sterile fashion.  The planned incision was infiltrated with 0.25% bupivacaine with epinephrine.  A timeout was performed.      The  scalp incision was carried down to the pericranium and the temporalis fascia.  The pericranium was incised and elevated off the skull with a separate flap.  Temporalis fascia and muscle were divided by monopolar cautery, leaving a 1 cm cuff attached to the superior temporal line.  The myocutaneous flap was retracted anteriorly and inferiorly using fishhooks.  His scalp and temporalis muscle were extensively vascularized and obtaining hemostasis was difficult.  Bur holes were placed at the keyhole, frontal convexity, and the inferior squamous temporal bone.  A frontotemporal craniotomy was completed using the high-speed cutting drill.  Bone flap was elevated and set aside.  We resected the squamous temporal bone until we reached the floor of the middle fossa.  We flattened the sphenoid ridge using rongeurs and the high-speed cutting drill.  Dural tack-up sutures were placed, as there was extensive epidural bleeding. The dura was opened in a curvilinear fashion and divided in the middle.  The dural flaps were retracted against the sphenoid ridge.  The operative microscope was draped and brought into the field.      Our first aim was to open the sylvian fissure.  This was done using sharp and blunt microdissection.  We identified the anterior temporal artery displaced superficially, likely due to the mass effect from the epidermoid along the basal temporal lobe.  We opened the fissure more proximally and identified the M1 segment of the left middle cerebral artery.  We followed this down to the carotid cistern.  We achieved good brain relaxation with the release of CSF.  There was a discrete component of epidermoid in the carotid cistern and there was actually mild compression of the right optic nerve.  We released the left frontal lobe from its basal arachnoid attachments and identified the left ICA terminus.  We identified the optic chiasm and confirmed that there was no epidermoid in the interoptic space.  We turned  our attention laterally towards the tentorial incisura.  We identified a mass in the crural cistern and this was contiguous with the component along the floor of the temporal fossa.  We began debulking, then we began removing the epidermoid.  We entered the capsule along the basal temporal lobe.  We removed the epidermoid using a combination of suction, forceps, and ring curettes.  The mass had the typical soft texture of an epidermoid and was amenable to removal. Specimens were sent for permanent pathology.  We were able to resect some of the capsule that was adherent to the tentorium.  We dissected the capsule sharply off the oculomotor nerve.  We confirmed good decompression of the temporal lobe and turned our attention to the basal cisterns.  We dissected the mass off of the anterior choroidal artery and resected the mass in the crural cistern.  We then turned our attention to the carotid cistern.  We suctioned the mass out and identified the thalamoperforators off the posterior communicating artery.  All of these were preserved and we removed this component completely. However, during dissection of the capsule off the supraclinoid ICA, we created a pinpoint defect at the origin of the left A1 segment. We applied a temporary aneurysm clip to the ICA and this reduced the bleeding from the A1 segment. We were able to trap and secure the bleeding point with bipolar cautery and were able to preserve filling of the A1. Temporary clip time was about 3 minutes. There were no changes in monitoring.    We turned our attention back to the crural component.  We removed as much of the mass as possible and identified the mid brain.  We identified the posterior cerebral and superior cerebellar artery, as well as the origin of the ocular motor nerve.  The surgical approach did not allow visualization of the posterior incisural components and we decided to leave this part behind.  All the spaces were irrigated and loose  fragments of epidermoid were removed.  There was no visible epidermoid in any of the operative fields.  Bleeding points were secured.  We inspected the left A1 segment again and there was no bleeding. There was no pseudoaneurysm development. We applied a micropatty sized piece of Nu-guaze cotton against the arterial repair site.     The dura was then reapproximated with interrupted and running 4-0 Nurolon.  The layer of Duragen was applied to areas of dural dehiscence.  The bone flap was replaced and secured with Synthes titanium miniplates and screws.  The defect from the resection of the squamous temporal bone was repaired with titanium mesh cranioplasty, less than 5 cm.  The temporalis muscle and fascia were reapproximated with interrupted 2-0 Vicryls.  A 10 flat DAVID drain was placed in the subgaleal space and tunneled out of the scalp through a separate stab incision.  The scalp was then closed in 2 layers.  A sterile dressing was applied.  The Malone head del cid was removed.  The patient was extubated and transported to the recovery room without incident.  There were no changes in SSEP or EEG monitoring throughout the operation.  Sponge and needle counts were correct at the end of the case.  Blood loss was approximately 500 mL, mostly from the myocutaneous flap bleeding.      I was present for the key portions and immediately available for the entire operation.         FADIA PRINGLE MD             D: 10/11/2019   T: 10/11/2019   MT: YARI      Name:     JESSICA SCHMIDT   MRN:      [id number]        Account:        [id number]   :      1980           Procedure Date: 10/07/2019      Document: [id number]

## 2019-10-14 ENCOUNTER — PATIENT OUTREACH (OUTPATIENT)
Dept: NEUROSURGERY | Facility: CLINIC | Age: 39
End: 2019-10-14

## 2019-10-14 NOTE — PROGRESS NOTES
Returned call.    Pt's wife called and stated the pt had occipital HAs last night and he had difficulty sleeping.  Pt was taking 200mg of Advil 6 every six hours and nothing else.  Writer suggested to had add Tylenol and taking the pain medications on a schedule instead based on pain level.  Also, add ice to help control the pain.  HAs seem to be worse at dinner time.  Wife stated that he does not eat well during the day.  Writer encouraged the the wife to push himself with eating.  Pt will try the suggestions and if they do not help his wife will call back.    Neurosurgery Discharge Follow-Up      Responsible Attending Physician:   Date of Discharge:    Discharge to:  Home    Current Status:  Pt is a 38 y/o male s/p Left transzygomatic approach for resection of tumor on 10/7.  Reports pre-op symptoms improved.   Operative  pain is decreasing.  Ambulating without assistance.  Pain well controlled with current meds, ample supply.  Reports incision CDI without signs of infection.  Denies redness, swelling, increased tenderness, or elevated temp.  Denies current bowel or bladder issues.  No visible sutures/staples in place.      Discharge instructions and medication use were reviewed.  RN triage/on call number given:  [phone] or after hours and w/e - [phone].  Patient verbalized understanding and agreement with current plan.    Follow up appointments/imaging/tests needed:

## 2019-10-16 NOTE — TELEPHONE ENCOUNTER
Writer spoke with Disability Claims representataive for [place] from [employer]. Writer had sent the documentation via fax to [employer]. Writer was informed [employer] has all that is needed and claim approved and processed.   Per [employer] stated not form is needed at this time. Provider progress notes faxed approved the request without needing an Attending Physicians  Statement.     [employer] phone    [phone]  Fax [phone]

## 2019-10-17 ENCOUNTER — CARE COORDINATION (OUTPATIENT)
Dept: NEUROSURGERY | Facility: CLINIC | Age: 39
End: 2019-10-17

## 2019-10-17 NOTE — PROGRESS NOTES
Recieved FMLA from [facility] [employer] in RIght Fax on 10/19/2019. Fax Dated 10/11/2019

## 2019-10-21 ENCOUNTER — OFFICE VISIT (OUTPATIENT)
Dept: FAMILY MEDICINE | Facility: CLINIC | Age: 39
End: 2019-10-21
Payer: COMMERCIAL

## 2019-10-21 VITALS
TEMPERATURE: 98.2 F | BODY MASS INDEX: 29.02 KG/M2 | OXYGEN SATURATION: 100 % | HEART RATE: 72 BPM | DIASTOLIC BLOOD PRESSURE: 70 MMHG | SYSTOLIC BLOOD PRESSURE: 110 MMHG | WEIGHT: 214 LBS

## 2019-10-21 DIAGNOSIS — Z98.890 S/P CRANIOTOMY: Primary | ICD-10-CM

## 2019-10-21 DIAGNOSIS — L72.0 EPIDERMOID CYST: ICD-10-CM

## 2019-10-21 PROCEDURE — 99495 TRANSJ CARE MGMT MOD F2F 14D: CPT | Performed by: FAMILY MEDICINE

## 2019-10-21 NOTE — PATIENT INSTRUCTIONS
Patient's wound today is clean and dry.  He will continue with wound care per neurosurgery instructions.  He has an appointment next month with neurosurgery.  He and his wife now live in [place] and will be looking for primary care up in that area.  He will continue to follow on his epidermoid cysts that he has had removed at the [facility].

## 2019-10-21 NOTE — PROGRESS NOTES
Subjective     Volodymyr Valles is a 39 year old male who presents to clinic today for the following health issues:    [facility]       Hospital Follow-up Visit:    Hospital/Nursing Home/IP Rehab Facility: [facility]  Date of Admission: 10/07/2019  Date of Discharge: 10/09/2019  Reason(s) for Admission: Brain Cyst, SR today.            Problems taking medications regularly:  None       Medication changes since discharge: None       Problems adhering to non-medication therapy:  None      Summary of hospitalization:  [facility] discharge summary reviewed  Diagnostic Tests/Treatments reviewed.  Follow up needed: Suture removal  Other Healthcare Providers Involved in Patient s Care:         Specialist appointment - Next month  Update since discharge: improved.     Post Discharge Medication Reconciliation: discharge medications reconciled, continue medications without change.  Plan of care communicated with patient and family     Coding guidelines for this visit:  Type of Medical   Decision Making Face-to-Face Visit       within 7 Days of discharge Face-to-Face Visit        within 14 days of discharge   Moderate Complexity 55060 [id number]   High Complexity 85906 [id number]                Patient Active Problem List   Diagnosis     CARDIOVASCULAR SCREENING; LDL GOAL LESS THAN 160     Arachnoid cyst     WPW syndrome     Epidermoid cyst     History of Jeanette-Parkinson-White (WPW) syndrome     LeFort II fracture (H)     SAH (subarachnoid hemorrhage) (H)     Seizure (H)     Subluxation of tooth     Tooth avulsion     Tooth fracture     Caries     Partial edentulism, class III     Periodontitis, chronic, generalized     Torus mandibularis     S/P craniotomy     Past Surgical History:   Procedure Laterality Date     C ABLATION SUPRAVENT ARRHYTHMOGENIC FOCUS  1993     CRANIOTOMY, EXCISE TUMOR COMPLEX, COMBINED Left 10/7/2019    Procedure: Frontal temporal Approach For Resection Of Tumor;  Surgeon: Delio  Shalom Rincon MD;  Location: [facility] OR     CRANIOTOMY, RETROSIGMOID  2014    Procedure: CRANIOTOMY, RETROSIGMOID;  Left Retrosigmoid Approach, Resection Of Tumor, Ventriculostomy, Left  hip Fat [place] ;  Surgeon: Shalom Kebede MD;  Location: [facility] OR     HC REPAIR SLIDING INGUINAL HERNIA  2003    left     HERNIORRHAPHY UMBILICAL N/A 3/13/2017    Procedure: HERNIORRHAPHY UMBILICAL;  Surgeon: Africa Whittington MD;  [facility]     LAPAROSCOPIC HERNIORRHAPHY INGUINAL Right 3/13/2017    Procedure: LAPAROSCOPIC HERNIORRHAPHY INGUINAL;  Surgeon: Africa Whittington MD;  [facility]     PROCURE GRAFT FAT  2014    Procedure: PROCURE GRAFT FAT;;  Surgeon: Shalom Kebede MD;  Location: [facility] OR     VENTRICULOSTOMY  2014    Procedure: VENTRICULOSTOMY;;  Surgeon: Shalom Kebede MD;  Location: [facility] OR       Social History     Tobacco Use     Smoking status: Former Smoker     Packs/day: 0.30     Types: Cigarettes     Last attempt to quit: 10/1/2012     Years since quittin.0     Smokeless tobacco: Never Used   Substance Use Topics     Alcohol use: Yes     Alcohol/week: 15.0 standard drinks     Types: 15 Cans of beer per week     Family History   Problem Relation Age of Onset     Diabetes Paternal Grandfather      Hypertension Mother      Thyroid Disease Mother      Pacemaker Father      Skin Cancer Father      Endocrine Disease Father         prediabetes     Other - See Comments Father         kidney stones     No Known Problems Sister      No Known Problems Sister              Reviewed and updated as needed this visit by Provider         Review of Systems   ROS COMP: Constitutional, HEENT, cardiovascular, pulmonary, gi and gu systems are negative, except as otherwise noted.      Objective    /70 (Patient Position: Sitting, Cuff Size: Adult Regular)   Pulse 72   Temp 98.2  F (36.8  C) (Tympanic)   Wt 97.1 kg (214 lb)   SpO2 100%   BMI 29.02 kg/m    Body mass  index is 29.02 kg/m .  Physical Exam   GENERAL APPEARANCE: healthy, alert and no distress  SKIN: There are 2 incisions.  One where he had a drain placed in the other that goes from the top of the left temple down to in front of the left ear and a curvilinear fashion.  There were 2 continuous sutures in the long incision and one suture where the drain was.  All of these were removed without difficulty.  Wound is clean and dry.  There may be just a little tiny bit of separation at the end of the incision in front of the left ear.  Patient has an appointment next month with neurosurgery.            Assessment & Plan     No diagnosis found.     BMI:   Estimated body mass index is 29.02 kg/m  as calculated from the following:    Height as of 10/1/19: 1.829 m (6').    Weight as of this encounter: 97.1 kg (214 lb).   Weight management plan: Discussed healthy diet and exercise guidelines        Patient Instructions   Patient's wound today is clean and dry.  He will continue with wound care per neurosurgery instructions.  He has an appointment next month with neurosurgery.  He and his wife now live in [facility] [place] [facility] and will be looking for primary care up in that area.  He will continue to follow on his epidermoid cysts that he has had removed at the [facility].      Return in about 29 days (around 11/19/2019) for Neurosurgery appointment.    Levon Juarez MD  [facility]

## 2019-11-06 ENCOUNTER — HEALTH MAINTENANCE LETTER (OUTPATIENT)
Age: 39
End: 2019-11-06

## 2019-11-13 ENCOUNTER — PATIENT OUTREACH (OUTPATIENT)
Dept: NEUROSURGERY | Facility: CLINIC | Age: 39
End: 2019-11-13

## 2019-11-13 DIAGNOSIS — G93.0 EPIDERMOID CYST OF BRAIN: Primary | ICD-10-CM

## 2019-11-13 NOTE — PROGRESS NOTES
Pt's wife called and asked if pt needed to have imaging done prior to the appt with MD.  Pt will need an MRI.  Writer will send a note to the .

## 2019-11-14 ENCOUNTER — HOSPITAL ENCOUNTER (OUTPATIENT)
Dept: MRI IMAGING | Facility: CLINIC | Age: 39
Discharge: HOME | End: 2019-11-14
Attending: NEUROLOGICAL SURGERY | Admitting: NEUROLOGICAL SURGERY
Payer: COMMERCIAL

## 2019-11-14 DIAGNOSIS — G93.0 EPIDERMOID CYST OF BRAIN: ICD-10-CM

## 2019-11-14 PROCEDURE — 25500064 ZZH RX 255 OP 636: Performed by: NEUROLOGICAL SURGERY

## 2019-11-14 PROCEDURE — A9585 GADOBUTROL INJECTION: HCPCS | Performed by: NEUROLOGICAL SURGERY

## 2019-11-14 PROCEDURE — 70553 MRI BRAIN STEM W/O & W/DYE: CPT

## 2019-11-14 RX ORDER — GADOBUTROL 604.72 MG/ML
10 INJECTION INTRAVENOUS ONCE
Status: COMPLETED | OUTPATIENT
Start: 2019-11-14 | End: 2019-11-14

## 2019-11-14 RX ADMIN — GADOBUTROL 9 ML: 604.72 INJECTION INTRAVENOUS at 13:54

## 2019-11-19 ENCOUNTER — OFFICE VISIT (OUTPATIENT)
Dept: NEUROSURGERY | Facility: CLINIC | Age: 39
End: 2019-11-19
Payer: COMMERCIAL

## 2019-11-19 VITALS — HEART RATE: 74 BPM | DIASTOLIC BLOOD PRESSURE: 86 MMHG | SYSTOLIC BLOOD PRESSURE: 142 MMHG | OXYGEN SATURATION: 95 %

## 2019-11-19 DIAGNOSIS — R56.9 SEIZURES (H): ICD-10-CM

## 2019-11-19 DIAGNOSIS — G93.0 EPIDERMOID CYST OF BRAIN: Primary | ICD-10-CM

## 2019-11-19 ASSESSMENT — PAIN SCALES - GENERAL: PAINLEVEL: NO PAIN (0)

## 2019-11-19 NOTE — PATIENT INSTRUCTIONS
Follow up with Dr. Kebede in one with a MRI before the appointment.    Referral written for neurology for a seizure evaluation.

## 2019-11-19 NOTE — NURSING NOTE
Chief Complaint   Patient presents with     RECHECK     [facility] RETURN - 6 WEEK POST OP DOS 10/07/19       Efrain Oreilly, EMT

## 2019-11-19 NOTE — LETTER
11/19/2019      RE: Volodymyr Valles  [address] [place] [address]       Dear Dr. Clay:    We saw Mr. 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 His first surgery involved removing the large left posterior fossa component through an extended retrosigmoid approach was on 04/07/2014. Due to a seizure in October 2018 and progressive growth of the supratentorial component over the last 5 years, he underwent left frontotemporal craniotomy for resection of the supratentorial component on 10/07/2019. He tolerated the procedure well. He has had no postoperative seizures.    He has returned to work and is performing at a satisfactory level. His energy levels are good and he seems to be recovering well. His jaw is asymmetric to the left when chewing but there is no issue otherwise. He remains on Keppra which is managed by Dr. Meek. He has not had any obvious seizures since surgery.    On exam, most obvious feature is that he is missing his upper incisors. His overall general appearance is good. His incision is healing well. Extraocular movements intact. He moves all extremities equally and with good coordination. Gross neurological examination remains normal. NIH stroke scale score is 0.    REVIEW OF STUDIES: We reviewed his MRI from last week and compared it to his previous studies. There remains residual epidermoid along the medial temporal lobe on the left side. There is also persistent epidermoid along the middle 1/3 left tentorial incisura with mild mass effect on the midbrain. The component in the suprasellar space and the prepontine cistern and interpeduncular cistern has been resected. Overall, there is a little more epidermoid remaining in the recent operative site than we anticipated. But still, the vast majority has been resected.     IMPRESSION AND PLAN: Mr. Valles will need follow-up for his antiepileptic  medication. Since there remains a lesion against the medial temporal lobe, he may need to stay on an antiepileptic but we will defer this to our epilepsy neurologists. Because of his young age, he will undoubtedly require another operation at some point to resect the residual epidermoid. There is certainly no urgency to this. He is recovering well from the most recent operation. We will see him back in 1 year with a follow-up MRI.     Please do not hesitate to contact us with questions. We will keep you informed of his progress.    FADIA PRINGLE MD    I, Coral Huber, am serving as a scribe to document services personally performed by Fadia Pringle MD, based upon my observations and the provider's statements to me. All documentation has been reviewed by the aforementioned doctor prior to being entered into the official medical record.

## 2019-11-19 NOTE — LETTER
11/19/2019       RE: Volodymyr Valles  [address] [address]       Dear Dr. Clay:    We saw Mr. 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 His first surgery involved removing the large left posterior fossa component through an extended retrosigmoid approach was on 04/07/2014. Due to a seizure in October 2018 and progressive growth of the supratentorial component over the last 5 years, he underwent left frontotemporal craniotomy for resection of the supratentorial component on 10/07/2019. He tolerated the procedure well. He has had no postoperative seizures.    He has returned to work and is performing at a satisfactory level. His energy levels are good and he seems to be recovering well. His jaw is asymmetric to the left when chewing but there is no issue otherwise. He remains on Keppra which is managed by Dr. Meek. He has not had any obvious seizures since surgery.    On exam, most obvious feature is that he is missing his upper incisors. His overall general appearance is good. His incision is healing well. Extraocular movements intact. He moves all extremities equally and with good coordination. Gross neurological examination remains normal. NIH stroke scale score is 0.    REVIEW OF STUDIES: We reviewed his MRI from last week and compared it to his previous studies. There remains residual epidermoid along the medial temporal lobe on the left side. There is also persistent epidermoid along the middle 1/3 left tentorial incisura with mild mass effect on the midbrain. The component in the suprasellar space and the prepontine cistern and interpeduncular cistern has been resected. Overall, there is a little more epidermoid remaining in the recent operative site than we anticipated. But still, the vast majority has been resected.     IMPRESSION AND PLAN: Mr. Valles will need follow-up for his antiepileptic  medication. Since there remains a lesion against the medial temporal lobe, he may need to stay on an antiepileptic but we will defer this to our epilepsy neurologists. Because of his young age, he will undoubtedly require another operation at some point to resect the residual epidermoid. There is certainly no urgency to this. He is recovering well from the most recent operation. We will see him back in 1 year with a follow-up MRI.     Please do not hesitate to contact us with questions. We will keep you informed of his progress.    FADIA PRINGLE MD    I, Coral Huber, am serving as a scribe to document services personally performed by Fadia Pringle MD, based upon my observations and the provider's statements to me. All documentation has been reviewed by the aforementioned doctor prior to being entered into the official medical record.

## 2019-11-19 NOTE — PROGRESS NOTES
Dear Dr. Clay:    We saw Mr. 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 His first surgery involved removing the large left posterior fossa component through an extended retrosigmoid approach was on 04/07/2014. Due to a seizure in October 2018 and progressive growth of the supratentorial component over the last 5 years, he underwent left frontotemporal craniotomy for resection of the supratentorial component on 10/07/2019. He tolerated the procedure well. He has had no postoperative seizures.    He has returned to work and is performing at a satisfactory level. His energy levels are good and he seems to be recovering well. His jaw is asymmetric to the left when chewing but there is no issue otherwise. He remains on Keppra which is managed by Dr. Meek. He has not had any obvious seizures since surgery.    On exam, most obvious feature is that he is missing his upper incisors. His overall general appearance is good. His incision is healing well. Extraocular movements intact. He moves all extremities equally and with good coordination. Gross neurological examination remains normal. NIH stroke scale score is 0.    REVIEW OF STUDIES: We reviewed his MRI from last week and compared it to his previous studies. There remains residual epidermoid along the medial temporal lobe on the left side. There is also persistent epidermoid along the middle 1/3 left tentorial incisura with mild mass effect on the midbrain. The component in the suprasellar space and the prepontine cistern and interpeduncular cistern has been resected. Overall, there is a little more epidermoid remaining in the recent operative site than we anticipated. But still, the vast majority has been resected.     IMPRESSION AND PLAN: Mr. Valles will need follow-up for his antiepileptic medication. Since there remains a lesion against the medial temporal lobe, he may need to stay on  an antiepileptic but we will defer this to our epilepsy neurologists. Because of his young age, he will undoubtedly require another operation at some point to resect the residual epidermoid. There is certainly no urgency to this. He is recovering well from the most recent operation. We will see him back in 1 year with a follow-up MRI.     Please do not hesitate to contact us with questions. We will keep you informed of his progress.    FADIA PRINGLE MD    I, Coral Huber, am serving as a scribe to document services personally performed by Fadia Pringle MD, based upon my observations and the provider's statements to me. All documentation has been reviewed by the aforementioned doctor prior to being entered into the official medical record.

## 2019-11-20 ENCOUNTER — PATIENT OUTREACH (OUTPATIENT)
Dept: NEUROSURGERY | Facility: CLINIC | Age: 39
End: 2019-11-20

## 2020-11-09 ENCOUNTER — TELEPHONE (OUTPATIENT)
Dept: NEUROSURGERY | Facility: CLINIC | Age: 40
End: 2020-11-09

## 2020-11-09 NOTE — TELEPHONE ENCOUNTER
Health Call Center    Phone Message    May a detailed message be left on voicemail: yes     Reason for Call: Other: Patient returning a call from Stacey in regards to appointment being switched to virtual. Patient states he believes it should work but is worried about not being enough time to get home after MRI for appointment. States that other then that it should work.    Please advise     Action Taken: Other: [facility] NEUROSURGERY     Travel Screening: Not Applicable

## 2020-11-17 ENCOUNTER — ANCILLARY PROCEDURE (OUTPATIENT)
Dept: MRI IMAGING | Facility: CLINIC | Age: 40
End: 2020-11-17
Attending: NEUROLOGICAL SURGERY
Payer: COMMERCIAL

## 2020-11-17 ENCOUNTER — VIRTUAL VISIT (OUTPATIENT)
Dept: NEUROSURGERY | Facility: CLINIC | Age: 40
End: 2020-11-17
Payer: COMMERCIAL

## 2020-11-17 DIAGNOSIS — G93.0 EPIDERMOID CYST OF BRAIN: ICD-10-CM

## 2020-11-17 DIAGNOSIS — G93.0 EPIDERMOID CYST OF BRAIN: Primary | ICD-10-CM

## 2020-11-17 PROCEDURE — 99213 OFFICE O/P EST LOW 20 MIN: CPT | Mod: 95 | Performed by: NEUROLOGICAL SURGERY

## 2020-11-17 PROCEDURE — 70551 MRI BRAIN STEM W/O DYE: CPT | Performed by: STUDENT IN AN ORGANIZED HEALTH CARE EDUCATION/TRAINING PROGRAM

## 2020-11-17 NOTE — LETTER
"11/17/2020       RE: Volodymyr Valles  [address] [place] [address]     Dear Colleague,    Thank you for referring your patient, Volodymyr Valles, to the [facility] NEUROSURGERY CLINIC [place] at [facility]. Please see a copy of my visit note below.    Volodymyr Valles is a 40 year old male who is being evaluated via a billable telephone visit.      The patient has been notified of following:     \"This telephone visit will be conducted via a call between you and your physician/provider. We have found that certain health care needs can be provided without the need for a physical exam.  This service lets us provide the care you need with a short phone conversation.  If a prescription is necessary we can send it directly to your pharmacy.  If lab work is needed we can place an order for that and you can then stop by our lab to have the test done at a later time.    Telephone visits are billed at different rates depending on your insurance coverage. During this emergency period, for some insurers they may be billed the same as an in-person visit.  Please reach out to your insurance provider with any questions.    If during the course of the call the physician/provider feels a telephone visit is not appropriate, you will not be charged for this service.\"    Patient has given verbal consent for Telephone visit? YES    What phone number would you like to be contacted at?     How would you like to obtain your AVS? mychart    Phone call duration: 15 minutes    Zurdo Rose, EMT    No seizures, Still on Keppra. No headaches. Follow up MRI one year.  CLARISSA Kebede MD      Again, thank you for allowing me to participate in the care of your patient.      Sincerely,    Shalom Kebede MD      "

## 2020-11-17 NOTE — LETTER
"11/17/2020      RE: Volodymyr Valles  [address] [place] [address]       Volodymyr Valles is a 40 year old male who is being evaluated via a billable telephone visit.      The patient has been notified of following:     \"This telephone visit will be conducted via a call between you and your physician/provider. We have found that certain health care needs can be provided without the need for a physical exam.  This service lets us provide the care you need with a short phone conversation.  If a prescription is necessary we can send it directly to your pharmacy.  If lab work is needed we can place an order for that and you can then stop by our lab to have the test done at a later time.    Telephone visits are billed at different rates depending on your insurance coverage. During this emergency period, for some insurers they may be billed the same as an in-person visit.  Please reach out to your insurance provider with any questions.    If during the course of the call the physician/provider feels a telephone visit is not appropriate, you will not be charged for this service.\"    Patient has given verbal consent for Telephone visit? YES    What phone number would you like to be contacted at?     How would you like to obtain your AVS? mychart    Phone call duration: 15 minutes    Zurdo Roes, EMT    No seizures, Still on Keppra. No headaches. Follow up MRI one year.  MD Shalom Whitaker MD      "

## 2020-11-17 NOTE — PROGRESS NOTES
"Volodymyr Valles is a 40 year old male who is being evaluated via a billable telephone visit.      The patient has been notified of following:     \"This telephone visit will be conducted via a call between you and your physician/provider. We have found that certain health care needs can be provided without the need for a physical exam.  This service lets us provide the care you need with a short phone conversation.  If a prescription is necessary we can send it directly to your pharmacy.  If lab work is needed we can place an order for that and you can then stop by our lab to have the test done at a later time.    Telephone visits are billed at different rates depending on your insurance coverage. During this emergency period, for some insurers they may be billed the same as an in-person visit.  Please reach out to your insurance provider with any questions.    If during the course of the call the physician/provider feels a telephone visit is not appropriate, you will not be charged for this service.\"    Patient has given verbal consent for Telephone visit? YES    What phone number would you like to be contacted at?     How would you like to [facility] your [facility]    Phone call duration: 15 minutes    Zurdo Rose, EMT    No seizures, Still on Keppra. No headaches. Follow up MRI one year.  CLARISSA Kebede MD  "

## 2020-11-17 NOTE — LETTER
"2020       RE: Volodymyr Vlales  [address] [place] [address]     Dear Colleague,    Thank you for referring your patient, Volodymyr Valles, to the [facility] NEUROSURGERY CLINIC [place] at [facility]. Please see a copy of my visit note below.    Volodymyr Valles is a 40 year old male who is being evaluated via a billable telephone visit.      The patient has been notified of following:     \"This telephone visit will be conducted via a call between you and your physician/provider. We have found that certain health care needs can be provided without the need for a physical exam.  This service lets us provide the care you need with a short phone conversation.  If a prescription is necessary we can send it directly to your pharmacy.  If lab work is needed we can place an order for that and you can then stop by our lab to have the test done at a later time.    Telephone visits are billed at different rates depending on your insurance coverage. During this emergency period, for some insurers they may be billed the same as an in-person visit.  Please reach out to your insurance provider with any questions.    If during the course of the call the physician/provider feels a telephone visit is not appropriate, you will not be charged for this service.\"    Patient has given verbal consent for Telephone visit? YES    What phone number would you like to be contacted at?     How would you like to obtain your AVS? mychart    Phone call duration: 15 minutes    [facility] Alcantar    No seizures, Still on Keppra. No headaches. Follow up MRI one year.  CLARISSA Kebede MD    Service Date: 2020      Wu Clay MD   [facility]   [address] [place]  [address]      RE: Volodymyr Valles   MRN: [id number]   : 1980      Dear Dr. Clay:      We spoke to Mr. Valles as part of a telephone visit in [facility] on 11/17/2020.  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  Specifically, he has undergone 2 craniotomies for resection.  The first was in 04/2014, a left retrosigmoid approach for removal of the posterior fossa component.  More recently, he is a little over a year out from a left frontal craniotomy and a subtotal resection of a supratentorial component.  He has not had any seizures since surgery.  He remains on Keppra and is tolerating his current dose.  He is followed by Dr. Garza.  He denies any headaches.  He has been working exclusively from home and feels that his work performance is good.      Over the phone, he sounded a bit fatigued.  Otherwise, he seemed to be in good spirits.  His phonation, speech and language all seem normal.      REVIEW OF STUDIES:  We went over his MRI from earlier today and compared it to his previous MRIs.  The residual epidermoid can be seen best on the DWI sequence, the FLAIR sequence, and the noncontrasted T1 sequence.  There are 2 sizable foci of epidermoid remaining.  The first is along the left uncus extending towards the left temporal horn.  There is mild mass effect on the medial left temporal lobe from this.  There is also a discrete focus in the left posterior ambient cistern, extending towards the quadrigeminal cistern.  There are some scattered residual foci in the left cerebellopontine cistern, which represent some recurrence.  The suprasellar component has not recurred.      IMPRESSION AND PLAN:  Given his young age and the residual epidermoid, we anticipate he will need more surgery at some point.  He seems to be doing well now and we can hold off on any surgery for the time being.  We will plan on repeating an MRI in 1 year and I will follow up with them afterwards.  We agree with Dr. Garza that he should stay on antiepileptic medication for the time being.  We will keep you informed of his progress.  Please  do not hesitate to contact us with questions.      Sincerely,         FADIA PRINGLE MD             D: 2020   T: 2020   MT: [place]iva      Name:     JESSICA SCHMIDT   MRN:      [id number]        Account:      [id number]   :      1980           Service Date: 2020      Document: [id number]        Again, thank you for allowing me to participate in the care of your patient.      Sincerely,    Fadia Pringle MD

## 2020-11-18 NOTE — PROGRESS NOTES
Service Date: 2020      Wu Clay MD   [facility]   [address] [place]  [address]      RE: Volodymyr Valles   MRN: [id number]   : 1980      Dear Dr. Clay:      We spoke to Mr. Valles as part of a telephone visit in [facility] on 2020.  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  Specifically, he has undergone 2 craniotomies for resection.  The first was in 2014, a left retrosigmoid approach for removal of the posterior fossa component.  More recently, he is a little over a year out from a left frontal craniotomy and a subtotal resection of a supratentorial component.  He has not had any seizures since surgery.  He remains on Keppra and is tolerating his current dose.  He is followed by Dr. Garza.  He denies any headaches.  He has been working exclusively from home and feels that his work performance is good.      Over the phone, he sounded a bit fatigued.  Otherwise, he seemed to be in good spirits.  His phonation, speech and language all seem normal.      REVIEW OF STUDIES:  We went over his MRI from earlier today and compared it to his previous MRIs.  The residual epidermoid can be seen best on the DWI sequence, the FLAIR sequence, and the noncontrasted T1 sequence.  There are 2 sizable foci of epidermoid remaining.  The first is along the left uncus extending towards the left temporal horn.  There is mild mass effect on the medial left temporal lobe from this.  There is also a discrete focus in the left posterior ambient cistern, extending towards the quadrigeminal cistern.  There are some scattered residual foci in the left cerebellopontine cistern, which represent some recurrence.  The suprasellar component has not recurred.      IMPRESSION AND PLAN:  Given his young age and the residual epidermoid, we anticipate he will need more surgery at some point.  He seems to be doing well now  and we can hold off on any surgery for the time being.  We will plan on repeating an MRI in 1 year and I will follow up with them afterwards.  We agree with Dr. Garza that he should stay on antiepileptic medication for the time being.  We will keep you informed of his progress.  Please do not hesitate to contact us with questions.      Sincerely,         FADIA PRINGLE MD             D: 2020   T: 2020   MT: oliva      Name:     JESSICA SCHMIDT   MRN:      [id number]        Account:      [id number]   :      1980           Service Date: 2020      Document: [id number]

## 2020-11-29 ENCOUNTER — HEALTH MAINTENANCE LETTER (OUTPATIENT)
Age: 40
End: 2020-11-29

## 2021-09-19 ENCOUNTER — HEALTH MAINTENANCE LETTER (OUTPATIENT)
Age: 41
End: 2021-09-19

## 2021-09-21 ENCOUNTER — TELEPHONE (OUTPATIENT)
Dept: NEUROSURGERY | Facility: CLINIC | Age: 41
End: 2021-09-21

## 2021-11-12 ENCOUNTER — ANCILLARY PROCEDURE (OUTPATIENT)
Dept: MRI IMAGING | Facility: CLINIC | Age: 41
End: 2021-11-12
Attending: NEUROLOGICAL SURGERY
Payer: COMMERCIAL

## 2021-11-12 DIAGNOSIS — G93.0 EPIDERMOID CYST OF BRAIN: ICD-10-CM

## 2021-11-12 PROCEDURE — 70551 MRI BRAIN STEM W/O DYE: CPT | Performed by: RADIOLOGY

## 2021-11-23 ENCOUNTER — VIRTUAL VISIT (OUTPATIENT)
Dept: NEUROSURGERY | Facility: CLINIC | Age: 41
End: 2021-11-23
Payer: COMMERCIAL

## 2021-11-23 DIAGNOSIS — G93.0 EPIDERMOID CYST OF BRAIN: Primary | ICD-10-CM

## 2021-11-23 PROCEDURE — 99213 OFFICE O/P EST LOW 20 MIN: CPT | Mod: 95 | Performed by: NEUROLOGICAL SURGERY

## 2021-11-23 NOTE — LETTER
2021      RE: Volodymyr Valles  [address] [place] [address]       Feels well. Working from home mostly. No headaches or seizures. Some anxiety but doing well. MRI without contrast in one year in [place]. Keppra remains. Will set up with seizure specialist in [place]. See dictated note. Telephone visit 15 minutes.  CLARISSA Kebede MD    Service Date: 2021    Wu Clay MD   [facility]  [address] [place] [address]     RE:  Volodymyr Valles  MRN: [id number]  : 1980    Dear Dr. Clay:    We spoke to Mr. Valles part of a telephone followup in [facility] on 2021.  As you know, he has residual epidermoid spanning multiple cranial compartment on the left side.  He has undergone 2 operations in the past to debulk this lesion.  His most recent operation was a little over 2 years ago.  He currently feels well.  He is working mostly from home.  He denies any headaches and he has not had any seizures in the past year.  He remains on Keppra.  He describes some anxiety, but otherwise doing well.    Over the phone, he sounded well.  His speech, language and phonation were normal.    REVIEW OF STUDIES:  We went over his MRI from 2021.  The residual epidermoid is best seen on the DWI sequence and the FLAIR sequence.  The largest part of the residual epidermoid extends in the crural cistern on the left side and extends to the left temporal horn.  There is also a component straddling the tentorial incisura and filling the left ambient cistern and extending into the quadrigeminal cistern.  Finally, there is a small recurrence in the left cerebellopontine cistern, where he had his first operation.  There is minimal edema in the left anterior temporal lobe, including the uncus.  There has not been much change in the size of the residual epidermoid.  Overall, his MRI is unchanged from last year.    IMPRESSION AND PLAN:  Because of his young  age and the expected growth of this epidermoid, he will likely need more surgery at some point based on his clinical and radiographic stability, he does not seem to need any intervention currently.  We will repeat an MRI in [place] in 1 year and follow up with him afterwards.      Please do not hesitate to contact us with questions.      Shalom Kebede MD      D: 2021   T: 2021   MT: SAVANNAH    Name:     JESSICA SCHMIDT  MRN:      [id number]        Account:      [id number]   :      1980           Service Date: 2021       Document: [id number]

## 2021-11-23 NOTE — LETTER
2021       RE: Volodymyr Valles  [address] [address]     Dear Colleague,    Thank you for referring your patient, Volodymyr Valles, to the [facility] NEUROSURGERY CLINIC [place] at [facility]. Please see a copy of my visit note below.    Feels well. Working from home mostly. No headaches or seizures. Some anxiety but doing well. MRI without contrast in one year in [place]. Keppra remains. Will set up with seizure specialist in [place]. See dictated note. Telephone visit 15 minutes.  CLARISSA Kebede MD    Service Date: 2021    Wu Clay MD   [facility]  [address] [place] [address]     RE:  Volodymyr Valles  MRN: [id number]  : 1980    Dear Dr. Clay:    We spoke to Mr. Valles part of a telephone followup in [facility] on 2021.  As you know, he has residual epidermoid spanning multiple cranial compartment on the left side.  He has undergone 2 operations in the past to debulk this lesion.  His most recent operation was a little over 2 years ago.  He currently feels well.  He is working mostly from home.  He denies any headaches and he has not had any seizures in the past year.  He remains on Keppra.  He describes some anxiety, but otherwise doing well.    Over the phone, he sounded well.  His speech, language and phonation were normal.    REVIEW OF STUDIES:  We went over his MRI from 2021.  The residual epidermoid is best seen on the DWI sequence and the FLAIR sequence.  The largest part of the residual epidermoid extends in the crural cistern on the left side and extends to the left temporal horn.  There is also a component straddling the tentorial incisura and filling the left ambient cistern and extending into the quadrigeminal cistern.  Finally, there is a small recurrence in the left cerebellopontine cistern, where he had his first operation.   There is minimal edema in the left anterior temporal lobe, including the uncus.  There has not been much change in the size of the residual epidermoid.  Overall, his MRI is unchanged from last year.    IMPRESSION AND PLAN:  Because of his young age and the expected growth of this epidermoid, he will likely need more surgery at some point based on his clinical and radiographic stability, he does not seem to need any intervention currently.  We will repeat an MRI in [place] in 1 year and follow up with him afterwards.      Please do not hesitate to contact us with questions.        Shalom Kebede MD        D: 2021   T: 2021   MT: SAVANNAH    Name:     JESSICA SCHMIDTNahed  MRN:      [id number]        Account:      [id number]   :      1980           Service Date: 2021       Document: [id number]

## 2021-11-23 NOTE — LETTER
11/23/2021      RE: Volodymyr Valles  [address] [address]       Feels well. Working from home mostly. No headaches or seizures. Some anxiety but doing well. MRI without contrast in one year in [place]. Keppra remains. Will set up with seizure specialist in [place]. See dictated note. Telephone visit 15 minutes.  MD Shalom Whitaker MD

## 2021-11-23 NOTE — LETTER
11/23/2021       RE: Volodymyr Valles  [address] [address]     Dear Colleague,    Thank you for referring your patient, Volodymyr Valles, to the [facility] NEUROSURGERY CLINIC [place] at [facility]. Please see a copy of my visit note below.    Feels well. Working from home mostly. No headaches or seizures. Some anxiety but doing well. MRI without contrast in one year in [place]. Keppra remains. Will set up with seizure specialist in [place]. See dictated note. Telephone visit 15 minutes.  CLARISSA Kebede MD      Again, thank you for allowing me to participate in the care of your patient.      Sincerely,    Shalom Kebede MD

## 2021-11-23 NOTE — PROGRESS NOTES
Feels well. Working from home mostly. No headaches or seizures. Some anxiety but doing well. MRI without contrast in one year in [place]. Keppra remains. Will set up with seizure specialist in [place]. See dictated note. Telephone visit 15 minutes.  CLARISSA Kebede MD

## 2021-12-24 NOTE — PROGRESS NOTES
Service Date: 2021    Wu Clay MD   [facility]  [address] [place] [address]     RE:  Volodymyr Valles  MRN: [id number]  : 1980    Dear Dr. Clay:    We spoke to Mr. Valles part of a telephone followup in [facility] on 2021.  As you know, he has residual epidermoid spanning multiple cranial compartment on the left side.  He has undergone 2 operations in the past to debulk this lesion.  His most recent operation was a little over 2 years ago.  He currently feels well.  He is working mostly from home.  He denies any headaches and he has not had any seizures in the past year.  He remains on Keppra.  He describes some anxiety, but otherwise doing well.    Over the phone, he sounded well.  His speech, language and phonation were normal.    REVIEW OF STUDIES:  We went over his MRI from 2021.  The residual epidermoid is best seen on the DWI sequence and the FLAIR sequence.  The largest part of the residual epidermoid extends in the crural cistern on the left side and extends to the left temporal horn.  There is also a component straddling the tentorial incisura and filling the left ambient cistern and extending into the quadrigeminal cistern.  Finally, there is a small recurrence in the left cerebellopontine cistern, where he had his first operation.  There is minimal edema in the left anterior temporal lobe, including the uncus.  There has not been much change in the size of the residual epidermoid.  Overall, his MRI is unchanged from last year.    IMPRESSION AND PLAN:  Because of his young age and the expected growth of this epidermoid, he will likely need more surgery at some point based on his clinical and radiographic stability, he does not seem to need any intervention currently.  We will repeat an MRI in [place] in 1 year and follow up with him afterwards.      Please do not hesitate to contact us with questions.    Sincerely,      Shalom Kebede MD        D: 2021   T: 2021   MT: SAVANNAH    Name:     ANDREW SCHMIDTN SUSANNAHNahed  MRN:      [id number]        Account:      [id number]   :      1980           Service Date: 2021       Document: [id number]

## 2021-12-24 NOTE — PATIENT INSTRUCTIONS
Repeat MRI brain without contrast in one year at [place] (ordered)    Telephone follow up after MRI has been completed.

## 2022-01-09 ENCOUNTER — HEALTH MAINTENANCE LETTER (OUTPATIENT)
Age: 42
End: 2022-01-09

## 2022-10-27 ENCOUNTER — TELEPHONE (OUTPATIENT)
Dept: NEUROSURGERY | Facility: CLINIC | Age: 42
End: 2022-10-27

## 2022-10-28 ENCOUNTER — TELEPHONE (OUTPATIENT)
Dept: NEUROSURGERY | Facility: CLINIC | Age: 42
End: 2022-10-28

## 2022-11-21 ENCOUNTER — HEALTH MAINTENANCE LETTER (OUTPATIENT)
Age: 42
End: 2022-11-21

## 2022-11-28 ENCOUNTER — ANCILLARY PROCEDURE (OUTPATIENT)
Dept: MRI IMAGING | Facility: CLINIC | Age: 42
End: 2022-11-28
Attending: NEUROLOGICAL SURGERY
Payer: COMMERCIAL

## 2022-11-28 DIAGNOSIS — G93.0 EPIDERMOID CYST OF BRAIN: ICD-10-CM

## 2022-11-28 PROCEDURE — 70551 MRI BRAIN STEM W/O DYE: CPT | Mod: GC | Performed by: RADIOLOGY

## 2023-01-24 ENCOUNTER — VIRTUAL VISIT (OUTPATIENT)
Dept: NEUROSURGERY | Facility: CLINIC | Age: 43
End: 2023-01-24
Payer: COMMERCIAL

## 2023-01-24 DIAGNOSIS — G93.0 EPIDERMOID CYST OF BRAIN: Primary | ICD-10-CM

## 2023-01-24 PROCEDURE — 99213 OFFICE O/P EST LOW 20 MIN: CPT | Performed by: NEUROLOGICAL SURGERY

## 2023-01-24 NOTE — LETTER
1/24/2023       RE: Volodymyr Valles  [address] [place] [address]     Dear Colleague,    Thank you for referring your patient, Volodymyr Valles, to the [facility] NEUROSURGERY CLINIC [facility]. Please see a copy of my visit note below.    Feels well. No HA, On Keppra, anxiety intermittently. Repeat MRI in one year without contrast in [place].  See dictated note. Visit 15 minutes.  CLARISSA Kebede MD        Again, thank you for allowing me to participate in the care of your patient.      Sincerely,    Shalom Kebede MD

## 2023-01-24 NOTE — LETTER
2023       RE: Volodymyr Valles  [address] [place] [address]     Dear Colleague,    Thank you for referring your patient, Volodymyr Valles, to the [facility] NEUROSURGERY CLINIC [place] at [facility]. Please see a copy of my visit note below.    Feels well. No HA, On Keppra, anxiety intermittently. Repeat MRI in one year without contrast in [place].  See dictated note. Visit 15 minutes.  CLARISSA Kebede MD      Service Date: 2023    Wu Clay MD  [facility]  [address] [place]  [address]    RE:  Volodymyr Valles  MRN:  [id number]  :  1980    Dear Dr. Clay:    We spoke to Mr. Valles as part of a telephone followup in [facility] on 2023.  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  There is still a substantial residual that we anticipate will require resection in the next few years.  We have been observing this residual for the time being.    He currently feels well.  Apart from intermittent anxiety, he is doing well.  He denies any cognitive troubles.  He is working full time.  He works mostly from home and goes to the office 1 day a week.  He is not having any headaches.  He has not had any obvious seizure activity.  He remains on Keppra and is managed by Dr. Meek in Neurology.    Over the phone, he sounded well.  His speech, language and phonation are normal.    REVIEW OF STUDIES:  We went over his MRI from 2022.  As before, the mass is best seen on the DWI sequence and on the T2 sequences.  The largest component of the residual mass is along the tentorial incisura and compressing the medial left temporal lobe.  There is some residual in the posterior fossa but the brainstem is completely decompressed.  There is also some residual in the crural cistern and  suprasellar spaces, but these components are stable.  Overall, the mass is either slightly larger in aggregate or about the same.    IMPRESSION AND PLAN:  Mr. Schmidt is aware that he will need more surgery at some point.  Since he is feeling well, he prefers to continue observation.  We will repeat an MRI of the brain without contrast in 1 year and this can be done in [place].  He inquired about radiation therapy for this and we discouraged this option.  Please do not hesitate to contact us with questions.    Sincerely,    Shalom Kebede MD        D: 2023   T: 2023   MT: oliva    Name:     JESSICA SCHMIDT  MRN:      [id number]        Account:      [id number]   :      1980           Service Date: 2023       Document: [id number]

## 2023-01-24 NOTE — PROGRESS NOTES
Feels well. No HA, On Keppra, anxiety intermittently. Repeat MRI in one year without contrast in [place].  See dictated note. Visit 15 minutes.  CLARISSA Kebede MD

## 2023-01-24 NOTE — LETTER
January 24, 2023       TO: Volodymyr Valles  [address]  [place] [address]       DearMr.Reena,    We are writing to inform you of your test results.    {[facility] results letter list:081250}    No results found from the In Basket message.    ***

## 2023-01-24 NOTE — LETTER
2023      RE: Volodymyr Valles  [address] [place] [address]       Feels well. No HA, On Keppra, anxiety intermittently. Repeat MRI in one year without contrast in [place].  See dictated note. Visit 15 minutes.  CLARISSA Kebede MD      Service Date: 2023    Wu Clay MD  [facility]  [address] [place]  [address]    RE:  Volodymyr Valles  MRN:  [id number]  :  1980    Dear Dr. Clay:    We spoke to Mr. Valles as part of a telephone followup in [facility] on 2023.  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  There is still a substantial residual that we anticipate will require resection in the next few years.  We have been observing this residual for the time being.    He currently feels well.  Apart from intermittent anxiety, he is doing well.  He denies any cognitive troubles.  He is working full time.  He works mostly from home and goes to the office 1 day a week.  He is not having any headaches.  He has not had any obvious seizure activity.  He remains on Keppra and is managed by Dr. Meek in Neurology.    Over the phone, he sounded well.  His speech, language and phonation are normal.    REVIEW OF STUDIES:  We went over his MRI from 2022.  As before, the mass is best seen on the DWI sequence and on the T2 sequences.  The largest component of the residual mass is along the tentorial incisura and compressing the medial left temporal lobe.  There is some residual in the posterior fossa but the brainstem is completely decompressed.  There is also some residual in the crural cistern and suprasellar spaces, but these components are stable.  Overall, the mass is either slightly larger in aggregate or about the same.    IMPRESSION AND PLAN:  Mr. Valles is aware that he will need more surgery at some point.  Since he is  feeling well, he prefers to continue observation.  We will repeat an MRI of the brain without contrast in 1 year and this can be done in [place].  He inquired about radiation therapy for this and we discouraged this option.  Please do not hesitate to contact us with questions.    Sincerely,    Shalom Kebede MD        D: 2023   T: 2023   MT: oliva    Name:     JESSICA SCHMIDT  MRN:      [id number]        Account:      [id number]   :      1980           Service Date: 2023       Document: [id number]

## 2023-01-27 NOTE — PROGRESS NOTES
Service Date: 2023    Wu Clay MD  [facility]  [address] [place] [address]  [address]    RE:  Volodymyr Valles  MRN:  [id number]  :  1980    Dear Dr. Clay:    We spoke to Mr. Valles as part of a telephone followup in [facility] on 2023.  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  There is still a substantial residual that we anticipate will require resection in the next few years.  We have been observing this residual for the time being.    He currently feels well.  Apart from intermittent anxiety, he is doing well.  He denies any cognitive troubles.  He is working full time.  He works mostly from home and goes to the office 1 day a week.  He is not having any headaches.  He has not had any obvious seizure activity.  He remains on Keppra and is managed by Dr. Meek in Neurology.    Over the phone, he sounded well.  His speech, language and phonation are normal.    REVIEW OF STUDIES:  We went over his MRI from 2022.  As before, the mass is best seen on the DWI sequence and on the T2 sequences.  The largest component of the residual mass is along the tentorial incisura and compressing the medial left temporal lobe.  There is some residual in the posterior fossa but the brainstem is completely decompressed.  There is also some residual in the crural cistern and suprasellar spaces, but these components are stable.  Overall, the mass is either slightly larger in aggregate or about the same.    IMPRESSION AND PLAN:  Mr. Valles is aware that he will need more surgery at some point.  Since he is feeling well, he prefers to continue observation.  We will repeat an MRI of the brain without contrast in 1 year and this can be done in [place].  He inquired about radiation therapy for this and we discouraged this option.  Please do not hesitate to  contact us with questions.    Sincerely,    Shalom Kebede MD        D: 2023   T: 2023   MT: oliva    Name:     JESSICA SCHMIDTNahed  MRN:      [id number]        Account:      [id number]   :      1980           Service Date: 2023       Document: [id number]

## 2023-01-27 NOTE — PATIENT INSTRUCTIONS
Repeat MRI brain without contrast in November 2023 at [place] (ordered)    Follow up by phone after MRI    Continue seizure follow up with your neurologist

## 2023-04-16 ENCOUNTER — HEALTH MAINTENANCE LETTER (OUTPATIENT)
Age: 43
End: 2023-04-16

## 2023-06-07 ASSESSMENT — ENCOUNTER SYMPTOMS
FEVER: 0
HEADACHES: 0
SORE THROAT: 0
WEAKNESS: 0
DIZZINESS: 0
PALPITATIONS: 0
CONSTIPATION: 0
HEMATOCHEZIA: 0
MYALGIAS: 0
DYSURIA: 0
NERVOUS/ANXIOUS: 1
SHORTNESS OF BREATH: 0
DIARRHEA: 0
COUGH: 0
HEARTBURN: 0
CHILLS: 0
NAUSEA: 0
HEMATURIA: 0
EYE PAIN: 0
ARTHRALGIAS: 0
PARESTHESIAS: 0
ABDOMINAL PAIN: 0
FREQUENCY: 1
JOINT SWELLING: 0

## 2023-06-08 ENCOUNTER — OFFICE VISIT (OUTPATIENT)
Dept: FAMILY MEDICINE | Facility: CLINIC | Age: 43
End: 2023-06-08
Payer: COMMERCIAL

## 2023-06-08 VITALS
BODY MASS INDEX: 30.68 KG/M2 | WEIGHT: 231.5 LBS | HEART RATE: 73 BPM | OXYGEN SATURATION: 97 % | SYSTOLIC BLOOD PRESSURE: 134 MMHG | RESPIRATION RATE: 12 BRPM | TEMPERATURE: 98.9 F | DIASTOLIC BLOOD PRESSURE: 76 MMHG | HEIGHT: 73 IN

## 2023-06-08 DIAGNOSIS — Z13.220 LIPID SCREENING: Primary | ICD-10-CM

## 2023-06-08 DIAGNOSIS — Z00.00 ROUTINE GENERAL MEDICAL EXAMINATION AT A HEALTH CARE FACILITY: ICD-10-CM

## 2023-06-08 DIAGNOSIS — Z13.1 SCREENING FOR DIABETES MELLITUS: ICD-10-CM

## 2023-06-08 DIAGNOSIS — Z12.11 SCREEN FOR COLON CANCER: ICD-10-CM

## 2023-06-08 LAB
ERYTHROCYTE [DISTWIDTH] IN BLOOD BY AUTOMATED COUNT: 12 % (ref 10–15)
HCT VFR BLD AUTO: 45.9 % (ref 40–53)
HGB BLD-MCNC: 15.7 G/DL (ref 13.3–17.7)
MCH RBC QN AUTO: 30 PG (ref 26.5–33)
MCHC RBC AUTO-ENTMCNC: 34.2 G/DL (ref 31.5–36.5)
MCV RBC AUTO: 88 FL (ref 78–100)
PLATELET # BLD AUTO: 186 10E3/UL (ref 150–450)
RBC # BLD AUTO: 5.24 10E6/UL (ref 4.4–5.9)
WBC # BLD AUTO: 6.1 10E3/UL (ref 4–11)

## 2023-06-08 PROCEDURE — 99386 PREV VISIT NEW AGE 40-64: CPT | Performed by: FAMILY MEDICINE

## 2023-06-08 PROCEDURE — 36415 COLL VENOUS BLD VENIPUNCTURE: CPT | Performed by: FAMILY MEDICINE

## 2023-06-08 PROCEDURE — 85027 COMPLETE CBC AUTOMATED: CPT | Performed by: FAMILY MEDICINE

## 2023-06-08 PROCEDURE — 86706 HEP B SURFACE ANTIBODY: CPT | Performed by: FAMILY MEDICINE

## 2023-06-08 PROCEDURE — 80048 BASIC METABOLIC PNL TOTAL CA: CPT | Performed by: FAMILY MEDICINE

## 2023-06-08 PROCEDURE — 80061 LIPID PANEL: CPT | Performed by: FAMILY MEDICINE

## 2023-06-08 ASSESSMENT — ENCOUNTER SYMPTOMS
DIZZINESS: 0
JOINT SWELLING: 0
SHORTNESS OF BREATH: 0
CHILLS: 0
HEMATURIA: 0
PALPITATIONS: 0
NERVOUS/ANXIOUS: 1
SORE THROAT: 0
COUGH: 0
CONSTIPATION: 0
HEARTBURN: 0
HEADACHES: 0
MYALGIAS: 0
WEAKNESS: 0
DYSURIA: 0
HEMATOCHEZIA: 0
FREQUENCY: 1
ARTHRALGIAS: 0
FEVER: 0
NAUSEA: 0
DIARRHEA: 0
ABDOMINAL PAIN: 0
PARESTHESIAS: 0
EYE PAIN: 0

## 2023-06-08 ASSESSMENT — PAIN SCALES - GENERAL: PAINLEVEL: NO PAIN (0)

## 2023-06-08 NOTE — RESULT ENCOUNTER NOTE
Please inform of results if patient has not viewed in [employer] within 3 business days.    CBC Results - Your cell counts were normal.    Please call the clinic with any questions you may have.     Have a great day,    Dr. Adams

## 2023-06-08 NOTE — PROGRESS NOTES
SUBJECTIVE:   CC: Volodymyr is an 43 year old who presents for preventative health visit.       6/8/2023     2:47 PM   Additional Questions   Roomed by Danita LUQUE   Accompanied by None     Healthy Habits:    Getting at least 3 servings of Calcium per day:  NO    Bi-annual eye exam:  Yes    Dental care twice a year:  Yes    Sleep apnea or symptoms of sleep apnea:  None    Diet:  Regular (no restrictions)    Frequency of exercise:  6-7 days/week    Duration of exercise:  30-45 minutes    Taking medications regularly:  Yes    Medication side effects:  None    PHQ-2 Total Score:    Additional concerns today:  No    Works for [email or web address], may do some traveling.    Had some anxiety type symptoms of the start of covid. More anxious during MRI's. Once a week anxiety is interfering with his life/having trouble coping. Not interested in medications or therapy at this time.    Hx of seizure disorder and epidermoid brain cyst. Follows with neurosurgery. On [place].    Have you ever done Advance Care Planning? (For example, a Health Directive, POLST, or a discussion with a medical provider or your loved ones about your wishes): No, advance care planning information given to patient to review.  Patient declined advance care planning discussion at this time.    Social History     Tobacco Use     Smoking status: Former     Packs/day: 0.30     Types: Cigarettes     Quit date: 10/1/2012     Years since quitting: 10.6     Smokeless tobacco: Never   Vaping Use     Vaping status: Never Used   Substance Use Topics     Alcohol use: Yes     Alcohol/week: 15.0 standard drinks of alcohol     Types: 15 Cans of beer per week         6/7/2023     7:11 PM   Alcohol Use   Prescreen: >3 drinks/day or >7 drinks/week? Yes   AUDIT SCORE  11       Last PSA: No results found for: PSA    Reviewed orders with patient. Reviewed health maintenance and updated orders accordingly - Yes  Lab work is in process  BP Readings from Last 3 Encounters:   06/08/23 134/76    11/19/19 (!) 142/86   10/21/19 110/70    Wt Readings from Last 3 Encounters:   06/08/23 105 kg (231 lb 8 oz)   10/21/19 97.1 kg (214 lb)   10/01/19 99.3 kg (219 lb)                  Patient Active Problem List   Diagnosis     CARDIOVASCULAR SCREENING; LDL GOAL LESS THAN 160     Arachnoid cyst     WPW syndrome     Epidermoid cyst     History of Jeanette-Parkinson-White (WPW) syndrome     LeFort II fracture (H)     SAH (subarachnoid hemorrhage) (H)     Seizure (H)     Subluxation of tooth     Tooth avulsion     Tooth fracture     Caries     Partial edentulism, class III     Periodontitis, chronic, generalized     Torus mandibularis     S/P craniotomy     Past Surgical History:   Procedure Laterality Date     CRANIOTOMY, EXCISE TUMOR COMPLEX, COMBINED Left 10/7/2019    Procedure: Frontal temporal Approach For Resection Of Tumor;  Surgeon: Shalom Kebede MD;  Location: [facility] OR     CRANIOTOMY, RETROSIGMOID  4/7/2014    Procedure: CRANIOTOMY, RETROSIGMOID;  Left Retrosigmoid Approach, Resection Of Tumor, Ventriculostomy, Left  hip Fat [place] ;  Surgeon: Shalom Kebede MD;  Location: [facility] OR     HC REPAIR SLIDING INGUINAL HERNIA  2003    left     HERNIORRHAPHY UMBILICAL N/A 3/13/2017    Procedure: HERNIORRHAPHY UMBILICAL;  Surgeon: Africa Whittington MD;  Location: [facility]     LAPAROSCOPIC HERNIORRHAPHY INGUINAL Right 3/13/2017    Procedure: LAPAROSCOPIC HERNIORRHAPHY INGUINAL;  Surgeon: Africa Whittington MD;  Location: [facility]     PROCURE GRAFT FAT  4/7/2014    Procedure: PROCURE GRAFT FAT;;  Surgeon: Shalom Kebede MD;  Location: [facility]     VENTRICULOSTOMY  4/7/2014    Procedure: VENTRICULOSTOMY;;  Surgeon: Shalom Kebede MD;  Location: [facility] OR     ZZC ABLATION SUPRAVENT ARRHYTHMOGENIC FOCUS  1993       Social History     Tobacco Use     Smoking status: Former     Packs/day: 0.30     Types: Cigarettes     Quit date: 10/1/2012     Years since quitting: 10.6     Smokeless  tobacco: Never   Vaping Use     Vaping status: Never Used   Substance Use Topics     Alcohol use: Yes     Alcohol/week: 15.0 standard drinks of alcohol     Types: 15 Cans of beer per week     Family History   Problem Relation Age of Onset     Hypertension Mother      Thyroid Disease Mother      Pacemaker Father      Skin Cancer Father      Endocrine Disease Father         prediabetes     Other - See Comments Father         kidney stones     No Known Problems Sister      No Known Problems Sister      Heart Disease Maternal Grandfather      Diabetes Paternal Grandfather          Current Outpatient Medications   Medication Sig Dispense Refill     levETIRAcetam (KEPPRA) 500 MG tablet Take 1,000 mg by mouth 2 times daily        No Known Allergies    Reviewed and updated as needed this visit by clinical staff   Tobacco  Allergies  Meds  Problems  Med Hx  Surg Hx  Fam Hx          Reviewed and updated as needed this visit by Provider   Tobacco  Allergies  Meds  Problems  Med Hx  Surg Hx  Fam Hx       Social history reviewed  Past Medical History:   Diagnosis Date     Epidermoid cyst     brain     Seizures (H)      WPW syndrome     recurrent SVT- s/p ablation      Past Surgical History:   Procedure Laterality Date     CRANIOTOMY, EXCISE TUMOR COMPLEX, COMBINED Left 10/7/2019    Procedure: Frontal temporal Approach For Resection Of Tumor;  Surgeon: Shalom Kebede MD;  Location: [facility]     CRANIOTOMY, RETROSIGMOID  4/7/2014    Procedure: CRANIOTOMY, RETROSIGMOID;  Left Retrosigmoid Approach, Resection Of Tumor, Ventriculostomy, Left  hip Fat [place] ;  Surgeon: Shalom Kebede MD;  [facility]     HC REPAIR SLIDING INGUINAL HERNIA  2003    left     HERNIORRHAPHY UMBILICAL N/A 3/13/2017    Procedure: HERNIORRHAPHY UMBILICAL;  Surgeon: Africa Whittington MD;  [facility]     LAPAROSCOPIC HERNIORRHAPHY INGUINAL Right 3/13/2017    Procedure: LAPAROSCOPIC HERNIORRHAPHY INGUINAL;   "Surgeon: Africa Whittington MD;  Location: [facility]     PROCURE GRAFT FAT  4/7/2014    Procedure: PROCURE GRAFT FAT;;  Surgeon: Shalom Kebede MD;  Location: [facility]     VENTRICULOSTOMY  4/7/2014    Procedure: VENTRICULOSTOMY;;  Surgeon: Shalom Kebede MD;  Location: [facility]     ZZC ABLATION SUPRAVENT ARRHYTHMOGENIC FOCUS  1993     Review of Systems   Constitutional: Negative for chills and fever.   HENT: Negative for congestion, ear pain, hearing loss and sore throat.    Eyes: Negative for pain and visual disturbance.   Respiratory: Negative for cough and shortness of breath.    Cardiovascular: Negative for chest pain, palpitations and peripheral edema.   Gastrointestinal: Negative for abdominal pain, constipation, diarrhea, heartburn, hematochezia and nausea.   Genitourinary: Positive for frequency. Negative for dysuria, genital sores, hematuria, impotence, penile discharge and urgency.   Musculoskeletal: Negative for arthralgias, joint swelling and myalgias.   Skin: Negative for rash.   Neurological: Negative for dizziness, weakness, headaches and paresthesias.   Psychiatric/Behavioral: Negative for mood changes. The patient is nervous/anxious.      OBJECTIVE:   /76 (BP Location: Left arm, Patient Position: Chair, Cuff Size: Adult Large)   Pulse 73   Temp 98.9  F (37.2  C) (Temporal)   Resp 12   Ht 1.852 m (6' 0.91\")   Wt 105 kg (231 lb 8 oz)   SpO2 97%   BMI 30.62 kg/m      Physical Exam  GENERAL: healthy, alert and no distress  EYES: Eyes grossly normal to inspection, PERRL and conjunctivae and sclerae normal  HENT: ear canals and TM's normal, nose and mouth without ulcers or lesions  NECK: no adenopathy, no asymmetry, masses, or scars and thyroid normal to palpation  RESP: lungs clear to auscultation - no rales, rhonchi or wheezes  CV: regular rate and rhythm, normal S1 S2, no S3 or S4, no murmur, click or rub, no peripheral edema and peripheral pulses strong  ABDOMEN: soft, " [No Acute Distress] : no acute distress [Normal Sclera/Conjunctiva] : normal sclera/conjunctiva nontender, no hepatosplenomegaly, no masses and bowel sounds normal  MS: no gross musculoskeletal defects noted, no edema  SKIN: no suspicious lesions or rashes  NEURO: Normal strength and tone, mentation intact and speech normal  PSYCH: mentation appears normal, affect normal/bright    Labs: pending    ASSESSMENT/PLAN:   1. Routine general medical examination at a health care facility: Discussed personal health and safety. Routine screenings as below. Appropriate anticipatory guidance, vaccinations, and health screening recommendations delivered according to the USPSTF and other appropriate society guidelines.  Patient understands and is agreeable with the plan ordered below.  - REVIEW OF HEALTH MAINTENANCE PROTOCOL ORDERS  - Colonoscopy Screening  Referral; Future  - Lipid panel reflex to direct LDL Non-fasting; Future  - Basic metabolic panel  (Ca, Cl, CO2, Creat, Gluc, K, Na, BUN); Future  - CBC with platelets; Future  - Hepatitis B Surface Antibody; Future    2. Screen for colon cancer  - Colonoscopy Screening  Referral; Future    3. Lipid screening  - Lipid panel reflex to direct LDL Non-fasting; Future  - Lipid panel reflex to direct LDL Non-fasting    4. Screening for diabetes mellitus  - Basic metabolic panel  (Ca, Cl, CO2, Creat, Gluc, K, Na, BUN); Future  - Basic metabolic panel  (Ca, Cl, CO2, Creat, Gluc, K, Na, BUN)       Follow-up Visit   Expected date:  Omid 10, 2024 (Approximate)      Follow Up Appointment Details:     Follow-up with whom?: Me    Follow-Up for what?: Adult Preventive    How?: In Person    Is this an as-needed follow-up?: No                  COUNSELING:   Reviewed preventive health counseling, as reflected in patient instructions       Regular exercise       Healthy diet/nutrition       Vision screening       Hearing screening       Colorectal cancer screening        He reports that he quit smoking about 10 years ago. His smoking use included cigarettes. He smoked an  [No JVD] : no jugular venous distention average of .3 packs per day. He has never used smokeless tobacco.    David Camacho MD  [facility]    Disclaimer: This note consists of symbols derived from keyboarding, dictation and/or voice recognition software. As a result, there may be errors in the script that have gone undetected. Please consider this when interpreting information found in this chart.     [No Respiratory Distress] : no respiratory distress  [Clear to Auscultation] : lungs were clear to auscultation bilaterally [Regular Rhythm] : with a regular rhythm [Normal S1, S2] : normal S1 and S2 [No Edema] : there was no peripheral edema [No Focal Deficits] : no focal deficits [Alert and Oriented x3] : oriented to person, place, and time [de-identified] : MASK  [de-identified] : BP  122/78   HR 70  [de-identified] : flesh colored skin tag right lower back

## 2023-06-09 LAB
ANION GAP SERPL CALCULATED.3IONS-SCNC: 12 MMOL/L (ref 7–15)
BUN SERPL-MCNC: 14.2 MG/DL (ref 6–20)
CALCIUM SERPL-MCNC: 9.6 MG/DL (ref 8.6–10)
CHLORIDE SERPL-SCNC: 105 MMOL/L (ref 98–107)
CHOLEST SERPL-MCNC: 196 MG/DL
CREAT SERPL-MCNC: 1.17 MG/DL (ref 0.67–1.17)
DEPRECATED HCO3 PLAS-SCNC: 24 MMOL/L (ref 22–29)
GFR SERPL CREATININE-BSD FRML MDRD: 78 ML/MIN/1.73M2
GLUCOSE SERPL-MCNC: 100 MG/DL (ref 70–99)
HBV SURFACE AB SERPL IA-ACNC: 0.19 M[IU]/ML
HBV SURFACE AB SERPL IA-ACNC: NONREACTIVE M[IU]/ML
HDLC SERPL-MCNC: 48 MG/DL
LDLC SERPL CALC-MCNC: 83 MG/DL
NONHDLC SERPL-MCNC: 148 MG/DL
POTASSIUM SERPL-SCNC: 3.8 MMOL/L (ref 3.4–5.3)
SODIUM SERPL-SCNC: 141 MMOL/L (ref 136–145)
TRIGL SERPL-MCNC: 323 MG/DL

## 2023-06-11 NOTE — RESULT ENCOUNTER NOTE
"Please inform of results if patient has not viewed in [employer] within 3 business days.    Lipids - Your \"bad\" cholesterol (non HDL) was mildly elevated. This can be improved with diet and exercise. All animal based foods such as meat, dairy, and eggs contain cholesterol. All plant based foods such as fruits, nuts, and vegetables do not.    BMP - Your blood sugar was 100. Your kidney function and electrolytes were normal.    You do not appear to be immune to hepatitis B and I would encourage you to get this 3 shot series.    You do not need a medication for this at this time.    Please call the clinic with any questions you may have.     Have a great day,    Dr. Adams    The 10-year ASCVD risk score (Iain MARTIN, et al., 2019) is: 2.2%    Values used to calculate the score:      Age: 43 years      Sex: Male      Is Non- : No      Diabetic: No      Tobacco smoker: No      Systolic Blood Pressure: 134 mmHg      Is BP treated: No      HDL Cholesterol: 48 mg/dL      Total Cholesterol: 196 mg/dL"

## 2023-10-25 ENCOUNTER — TELEPHONE (OUTPATIENT)
Dept: NEUROSURGERY | Facility: CLINIC | Age: 43
End: 2023-10-25
Payer: COMMERCIAL

## 2023-10-30 ENCOUNTER — TELEPHONE (OUTPATIENT)
Dept: FAMILY MEDICINE | Facility: CLINIC | Age: 43
End: 2023-10-30
Payer: COMMERCIAL

## 2023-10-30 ENCOUNTER — TELEPHONE (OUTPATIENT)
Dept: GASTROENTEROLOGY | Facility: CLINIC | Age: 43
End: 2023-10-30
Payer: COMMERCIAL

## 2023-10-30 DIAGNOSIS — F40.240 CLAUSTROPHOBIA: Primary | ICD-10-CM

## 2023-10-30 NOTE — TELEPHONE ENCOUNTER
Pt has scheduled brain MRI on 12/4. He was told to ask PCP for a relaxation medication for this to be done . Uses [facility] in [place].

## 2023-10-30 NOTE — TELEPHONE ENCOUNTER
"Endoscopy Scheduling Screen    Have you had a positive Covid test in the last 14 days?  No      Are you active on MyChart?   Yes      What insurance is in the chart?  Other:  [employer]      Ordering/Referring Provider: toyin   (If ordering provider performs procedure, schedule with ordering provider unless otherwise instructed. )    BMI: Estimated body mass index is 30.62 kg/m  as calculated from the following:    Height as of 6/8/23: 1.852 m (6' 0.91\").    Weight as of 6/8/23: 105 kg (231 lb 8 oz).     Sedation   Ordered  moderate sedation.   If patient BMI > 50 do not schedule in [facility].    If patient BMI > 45 do not schedule at [facility].    Are you taking methadone or Suboxone?  No      Are you taking any prescription medications for pain 3 or more times per week?   No      Do you have a history of malignant hyperthermia or adverse reaction to anesthesia?  No      (Females) Are you currently pregnant?          Have you been diagnosed or told you have pulmonary hypertension?   No      Do you have an LVAD?  No      Have you been told you have moderate to severe sleep apnea?  No      Have you been told you have COPD, asthma, or any other lung disease?  No      Do you have any heart conditions?  No       Have you ever had an organ transplant?   No      Have you ever had or are you awaiting a heart or lung transplant?   No    Have you had a stroke or transient ischemic attack (TIA aka \"mini stroke\" in the last 6 months?   No      Have you been diagnosed with or been told you have cirrhosis of the liver?   No      Are you currently on dialysis?   No      Do you need assistance transferring?   No    BMI: Estimated body mass index is 30.62 kg/m  as calculated from the following:    Height as of 6/8/23: 1.852 m (6' 0.91\").    Weight as of 6/8/23: 105 kg (231 lb 8 oz).       Is patients BMI > 40 and scheduling location [facility]?  No      Do you take an injectable medication for weight loss or diabetes (excluding " insulin)?  No      Do you take the medication Naltrexone?  No      Do you take blood thinners?  No       Prep   Are you currently on dialysis or do you have chronic kidney disease?  No      Do you have a diagnosis of diabetes?  No      Do you have a diagnosis of cystic fibrosis (CF)?  No      On a regular basis do you go 3 -5 days between bowel movements?  No      BMI > 40?  No      Preferred Pharmacy:    [facility]/pharmacy #[id number] - [place], [place] - [address] [facility] AT [facility]  [address] [facility] [facility]  [place] [address]  Phone: [phone] Fax: [phone]      Final Scheduling Details   Colonoscopy prep sent?  Standard MiraLAX      Procedure scheduled  Colonoscopy      Surgeon:  JAISON     Date of procedure:  1/9/24     Pre-OP / PAC:   No - Not required for this site.    Location  [facility] - [facility] - Patient preference.    Sedation   Moderate Sedation - Per order.      Patient Reminders:   You will receive a call from a Nurse to review instructions and health history.  This assessment must be completed prior to your procedure.  Failure to complete the Nurse assessment may result in the procedure being cancelled.      On the day of your procedure, please designate an adult(s) who can drive you home stay with you for the next 24 hours. The medicines used in the exam will make you sleepy. You will not be able to drive.      You cannot take public transportation, ride share services, or non-medical taxi service without a responsible caregiver.  Medical transport services are allowed with the requirement that a responsible caregiver will receive you at your destination.  We require that drivers and caregivers are confirmed prior to your procedure.

## 2023-10-31 RX ORDER — ALPRAZOLAM 0.5 MG
0.5 TABLET ORAL
Qty: 2 TABLET | Refills: 0 | Status: SHIPPED | OUTPATIENT
Start: 2023-12-01 | End: 2024-01-09

## 2023-12-04 ENCOUNTER — ANCILLARY PROCEDURE (OUTPATIENT)
Dept: MRI IMAGING | Facility: CLINIC | Age: 43
End: 2023-12-04
Attending: NEUROLOGICAL SURGERY
Payer: COMMERCIAL

## 2023-12-04 DIAGNOSIS — G93.0 EPIDERMOID CYST OF BRAIN: ICD-10-CM

## 2023-12-04 PROCEDURE — 70551 MRI BRAIN STEM W/O DYE: CPT | Mod: GC | Performed by: RADIOLOGY

## 2023-12-19 ENCOUNTER — VIRTUAL VISIT (OUTPATIENT)
Dept: NEUROSURGERY | Facility: CLINIC | Age: 43
End: 2023-12-19
Payer: COMMERCIAL

## 2023-12-19 DIAGNOSIS — G93.0 EPIDERMOID CYST OF BRAIN: Primary | ICD-10-CM

## 2023-12-19 PROCEDURE — 99212 OFFICE O/P EST SF 10 MIN: CPT | Mod: 93 | Performed by: NEUROLOGICAL SURGERY

## 2023-12-19 NOTE — LETTER
12/19/2023       RE: Volodymyr Valles  [address]  [place] [address]       Dear Colleague,    Thank you for referring your patient, Volodymyr Valles, to the [facility] [place] [facility]. Please see a copy of my visit note below.    David Camacho MD  [address]  [place] [address]     Dear Dr. Camacho:    We spoke to Mr. Valles as part of a telephone followup in [facility] on 12/19/2023.  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  There is still a substantial residual that we anticipate will require resection in the next few years.  We have been observing this residual for the time being.     He currently feels well.  His anxiety is improved, and he attributes the improvement to cessation of drinking for the past 6 weeks.  He denies any cognitive troubles.  He is working full time.  He works mostly from home and goes to the office twice a week.  He is not having any headaches.  He has not had any obvious seizure activity.  He remains on Keppra.     Over the phone, he sounded well.  His speech, language and phonation are normal.     REVIEW OF STUDIES:  We went over his MRI from 12/4/2023.  As before, the mass is best seen on the DWI sequence and on the T2 and FLAIR sequences.  The largest component of the residual mass is along the tentorial incisura and compressing the medial left temporal lobe.  There is some residual in the posterior fossa.  There is also some residual in the crural cistern and suprasellar spaces, but these components are stable.  The medial temporal component appears slightly larger and there is slightly increased mass effect on the temporal lobe without edema. Overall, the mass is slightly larger in aggregate     IMPRESSION AND PLAN:  Mr. Valles is aware that he will  need more surgery at some point.  Since he is feeling well, he prefers to continue observation.  We will repeat an MRI of the brain without contrast in 1 year and this can be done in [place]. Since he is tolerating the Keppra, he should remain on this for the time being. Please do not hesitate to contact us with questions.        Again, thank you for allowing me to participate in the care of your patient.      Sincerely,    Shalom Kebede MD

## 2023-12-19 NOTE — NURSING NOTE
Is the patient currently in the state of [place]? YES    Visit mode:TELEPHONE    If the visit is dropped, the patient can be reconnected by: TELEPHONE VISIT: Phone number:   Telephone Information:   Mobile [phone]       Will anyone else be joining the visit? NO  (If patient encounters technical issues they should call [phone] :[phone])    How would you like to obtain your AVS? MyChart    Are changes needed to the allergy or medication list? Pt stated no med changes    Reason for visit: Telephone (11 month follow-up epidermoid cyst of brain )    Tisha ESCAMILLAF

## 2023-12-19 NOTE — PROGRESS NOTES
Virtual Visit Details    Type of service:  Telephone Visit   Phone call duration: 12 minutes   Date of service: 12/19/2023    David Camacho MD  [address] [facility] [address]     Dear Dr. Caamcho:    We spoke to Mr. Valles as part of a telephone followup in [facility] on 12/19/2023.  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  There is still a substantial residual that we anticipate will require resection in the next few years.  We have been observing this residual for the time being.     He currently feels well.  His anxiety is improved, and he attributes the improvement to cessation of drinking for the past 6 weeks.  He denies any cognitive troubles.  He is working full time.  He works mostly from home and goes to the office twice a week.  He is not having any headaches.  He has not had any obvious seizure activity.  He remains on Keppra.     Over the phone, he sounded well.  His speech, language and phonation are normal.     REVIEW OF STUDIES:  We went over his MRI from 12/4/2023.  As before, the mass is best seen on the DWI sequence and on the T2 and FLAIR sequences.  The largest component of the residual mass is along the tentorial incisura and compressing the medial left temporal lobe.  There is some residual in the posterior fossa.  There is also some residual in the crural cistern and suprasellar spaces, but these components are stable.  The medial temporal component appears slightly larger and there is slightly increased mass effect on the temporal lobe without edema. Overall, the mass is slightly larger in aggregate     IMPRESSION AND PLAN:  Mr. Valles is aware that he will need more surgery at some point.  Since he is feeling well, he prefers to continue observation.  We will repeat an MRI of the brain without contrast in 1 year and this can be done in [place]. Since he is  tolerating the Keppra, he should remain on this for the time being. Please do not hesitate to contact us with questions.    Sincerely,        Shalom Kebede MD  Department of Neurosurgery

## 2023-12-20 ENCOUNTER — TELEPHONE (OUTPATIENT)
Dept: GASTROENTEROLOGY | Facility: CLINIC | Age: 43
End: 2023-12-20

## 2023-12-20 NOTE — TELEPHONE ENCOUNTER
Pre visit planning completed.      Procedure details:    Patient scheduled for Colonoscopy  on 1/9/24.     Arrival time: 1120. Procedure time 1205    Pre op exam needed? N/A    Facility location: [facility]; [address], [place] [address]    Sedation type: Conscious sedation     Indication for procedure: Screen for colon cancer       Chart review:     Electronic implanted devices? No    Recent diagnosis of diverticulitis within the last 6 weeks? No    Diabetic? No      Medication review:    Anticoagulants? No    NSAIDS? No NSAID medications per patient's medication list.  RN will verify with pre-assessment call.    Other medication HOLDING recommendations:  N/A      Prep for procedure:     Bowel prep recommendation: Standard Miralax   Due to:  standard bowel prep.    Prep instructions sent via [employer]       Corinne Kliber, RN  Endoscopy Procedure Pre Assessment RN  [phone] option 4

## 2023-12-22 NOTE — TELEPHONE ENCOUNTER
Attempted to contact patient in order to complete pre assessment questions.     No answer. Left message to return call to [phone] option 4      Mary Pal RN  Endoscopy Procedure Pre Assessment RN

## 2023-12-28 NOTE — TELEPHONE ENCOUNTER
Pre assessment completed for upcoming procedure.   (Please see previous telephone encounter notes for complete details)    Patient  returned call.       Procedure details:    Arrival time and facility location reviewed.    Pre op exam needed? N/A    Designated  policy reviewed. Instructed to have someone stay 6 hours post procedure.     COVID policy reviewed.      Medication review:    Medications reviewed. Please see supporting documentation below. Holding recommendations discussed (if applicable).       Prep for procedure:     Procedure prep instructions reviewed.        Additional information needed?  N/A      Patient  verbalized understanding and had no questions or concerns at this time.      Tory Abreu RN  Endoscopy Procedure Pre Assessment RN  [phone] option 4

## 2024-01-09 ENCOUNTER — HOSPITAL ENCOUNTER (OUTPATIENT)
Facility: AMBULATORY SURGERY CENTER | Age: 44
Discharge: HOME | End: 2024-01-09
Attending: INTERNAL MEDICINE | Admitting: INTERNAL MEDICINE
Payer: COMMERCIAL

## 2024-01-09 VITALS
SYSTOLIC BLOOD PRESSURE: 122 MMHG | RESPIRATION RATE: 16 BRPM | OXYGEN SATURATION: 97 % | DIASTOLIC BLOOD PRESSURE: 96 MMHG | TEMPERATURE: 97.8 F | HEART RATE: 58 BPM

## 2024-01-09 LAB — COLONOSCOPY: NORMAL

## 2024-01-09 PROCEDURE — G8918 PT W/O PREOP ORDER IV AB PRO: HCPCS

## 2024-01-09 PROCEDURE — G8907 PT DOC NO EVENTS ON DISCHARG: HCPCS

## 2024-01-09 PROCEDURE — 45385 COLONOSCOPY W/LESION REMOVAL: CPT

## 2024-01-09 PROCEDURE — 45380 COLONOSCOPY AND BIOPSY: CPT | Mod: XS

## 2024-01-09 PROCEDURE — 88305 TISSUE EXAM BY PATHOLOGIST: CPT | Performed by: PATHOLOGY

## 2024-01-09 RX ORDER — ONDANSETRON 4 MG/1
4 TABLET, ORALLY DISINTEGRATING ORAL EVERY 6 HOURS PRN
Status: DISCONTINUED | OUTPATIENT
Start: 2024-01-09 | End: 2024-01-10 | Stop reason: HOSPADM

## 2024-01-09 RX ORDER — LIDOCAINE 40 MG/G
CREAM TOPICAL
Status: DISCONTINUED | OUTPATIENT
Start: 2024-01-09 | End: 2024-01-10 | Stop reason: HOSPADM

## 2024-01-09 RX ORDER — NALOXONE HYDROCHLORIDE 0.4 MG/ML
0.4 INJECTION, SOLUTION INTRAMUSCULAR; INTRAVENOUS; SUBCUTANEOUS
Status: DISCONTINUED | OUTPATIENT
Start: 2024-01-09 | End: 2024-01-10 | Stop reason: HOSPADM

## 2024-01-09 RX ORDER — PROCHLORPERAZINE MALEATE 10 MG
10 TABLET ORAL EVERY 6 HOURS PRN
Status: DISCONTINUED | OUTPATIENT
Start: 2024-01-09 | End: 2024-01-10 | Stop reason: HOSPADM

## 2024-01-09 RX ORDER — ONDANSETRON 2 MG/ML
4 INJECTION INTRAMUSCULAR; INTRAVENOUS EVERY 6 HOURS PRN
Status: DISCONTINUED | OUTPATIENT
Start: 2024-01-09 | End: 2024-01-10 | Stop reason: HOSPADM

## 2024-01-09 RX ORDER — FENTANYL CITRATE 50 UG/ML
INJECTION, SOLUTION INTRAMUSCULAR; INTRAVENOUS PRN
Status: DISCONTINUED | OUTPATIENT
Start: 2024-01-09 | End: 2024-01-09 | Stop reason: HOSPADM

## 2024-01-09 RX ORDER — FLUMAZENIL 0.1 MG/ML
0.2 INJECTION, SOLUTION INTRAVENOUS
Status: DISCONTINUED | OUTPATIENT
Start: 2024-01-09 | End: 2024-01-10 | Stop reason: HOSPADM

## 2024-01-09 RX ORDER — NALOXONE HYDROCHLORIDE 0.4 MG/ML
0.2 INJECTION, SOLUTION INTRAMUSCULAR; INTRAVENOUS; SUBCUTANEOUS
Status: DISCONTINUED | OUTPATIENT
Start: 2024-01-09 | End: 2024-01-10 | Stop reason: HOSPADM

## 2024-01-09 RX ORDER — ONDANSETRON 2 MG/ML
4 INJECTION INTRAMUSCULAR; INTRAVENOUS
Status: DISCONTINUED | OUTPATIENT
Start: 2024-01-09 | End: 2024-01-10 | Stop reason: HOSPADM

## 2024-01-09 NOTE — H&P
[facility] Anesthesia Pre-op History and Physical    Volodymyr Valles MRN# [id number]   Age: 43 year old YOB: 1980            Date of Exam 1/9/2024         Primary care provider: David Camacho         Chief Complaint and/or Reason for Procedure:     CRC screening - father with polyps       Active problem list:     Patient Active Problem List    Diagnosis Date Noted    S/P craniotomy 10/07/2019     Priority: Medium    Caries 03/01/2019     Priority: Medium    Partial edentulism, class III 03/01/2019     Priority: Medium    Periodontitis, chronic, generalized 03/01/2019     Priority: Medium    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     Priority: Medium    LeFort II fracture (H) 10/06/2018     Priority: Medium    Seizure (H) 10/06/2018     Priority: Medium    Subluxation of tooth 10/06/2018     Priority: Medium     Overview:   #24-45      Tooth avulsion 10/06/2018     Priority: Medium     Overview:   #9-10      Tooth fracture 10/06/2018     Priority: Medium     Overview:   #8      SAH (subarachnoid hemorrhage) (H) 10/05/2018     Priority: Medium    History of Jeanette-Parkinson-White (WPW) syndrome 03/07/2017     Priority: Medium    Epidermoid cyst 04/07/2014     Priority: Medium    WPW syndrome      Priority: Medium     recurrent SVT- s/p ablation      Arachnoid cyst      Priority: Medium    CARDIOVASCULAR SCREENING; LDL GOAL LESS THAN 160 10/31/2010     Priority: Medium            Medications (include herbals and vitamins):   Any Plavix use in the last 7 days? No     Current Outpatient Medications   Medication Sig    levETIRAcetam (KEPPRA) 500 MG tablet Take 1,000 mg by mouth 2 times daily     ALPRAZolam (XANAX) 0.5 MG tablet Take 1 tablet (0.5 mg) by mouth once as needed for anxiety (related to MRI) 30 minutes prior to MRI. May take additional second dose 30 minutes if needed.     Current Facility-Administered Medications   Medication    lidocaine (LMX4) kit    lidocaine 1 % 0.1-1 mL     ondansetron (ZOFRAN) injection 4 mg    sodium chloride (PF) 0.9% PF flush 3 mL    sodium chloride (PF) 0.9% PF flush 3 mL             Allergies:    No Known Allergies  Allergy to Latex? No  Allergy to tape?   No  Intolerances:           Physical Exam:   All vitals have been reviewed  Patient Vitals for the past 8 hrs:   BP Temp Temp src Resp SpO2   01/09/24 1145 (!) 128/92 97.8  F (36.6  C) Temporal 16 99 %     No intake/output data recorded.  Lungs:   No increased work of breathing, good air exchange, clear to auscultation bilaterally, no crackles or wheezing     Cardiovascular:   normal S1 and S2             Lab / Radiology Results:            Anesthetic risk and/or ASA classification:     2  Aziza King,

## 2024-01-11 LAB
PATH REPORT.COMMENTS IMP SPEC: NORMAL
PATH REPORT.COMMENTS IMP SPEC: NORMAL
PATH REPORT.FINAL DX SPEC: NORMAL
PATH REPORT.GROSS SPEC: NORMAL
PATH REPORT.MICROSCOPIC SPEC OTHER STN: NORMAL
PATH REPORT.RELEVANT HX SPEC: NORMAL
PHOTO IMAGE: NORMAL

## 2024-06-09 SDOH — HEALTH STABILITY: PHYSICAL HEALTH: ON AVERAGE, HOW MANY DAYS PER WEEK DO YOU ENGAGE IN MODERATE TO STRENUOUS EXERCISE (LIKE A BRISK WALK)?: 7 DAYS

## 2024-06-09 SDOH — HEALTH STABILITY: PHYSICAL HEALTH: ON AVERAGE, HOW MANY MINUTES DO YOU ENGAGE IN EXERCISE AT THIS LEVEL?: 20 MIN

## 2024-06-09 ASSESSMENT — SOCIAL DETERMINANTS OF HEALTH (SDOH): HOW OFTEN DO YOU GET TOGETHER WITH FRIENDS OR RELATIVES?: ONCE A WEEK

## 2024-06-10 ENCOUNTER — OFFICE VISIT (OUTPATIENT)
Dept: FAMILY MEDICINE | Facility: CLINIC | Age: 44
End: 2024-06-10
Payer: COMMERCIAL

## 2024-06-10 VITALS
DIASTOLIC BLOOD PRESSURE: 86 MMHG | BODY MASS INDEX: 30.88 KG/M2 | HEART RATE: 61 BPM | TEMPERATURE: 97.9 F | HEIGHT: 73 IN | WEIGHT: 233 LBS | OXYGEN SATURATION: 95 % | SYSTOLIC BLOOD PRESSURE: 132 MMHG

## 2024-06-10 DIAGNOSIS — R03.0 ELEVATED BLOOD PRESSURE READING WITHOUT DIAGNOSIS OF HYPERTENSION: ICD-10-CM

## 2024-06-10 DIAGNOSIS — G93.0 ARACHNOID CYST: ICD-10-CM

## 2024-06-10 DIAGNOSIS — Z86.79 HISTORY OF WOLFF-PARKINSON-WHITE (WPW) SYNDROME: ICD-10-CM

## 2024-06-10 DIAGNOSIS — R56.9 SEIZURE (H): ICD-10-CM

## 2024-06-10 DIAGNOSIS — Z00.00 ROUTINE HISTORY AND PHYSICAL EXAMINATION OF ADULT: Primary | ICD-10-CM

## 2024-06-10 PROBLEM — S02.412A: Status: RESOLVED | Noted: 2018-10-06 | Resolved: 2024-06-10

## 2024-06-10 LAB
ALBUMIN SERPL BCG-MCNC: 4.7 G/DL (ref 3.5–5.2)
ALP SERPL-CCNC: 61 U/L (ref 40–150)
ALT SERPL W P-5'-P-CCNC: 26 U/L (ref 0–70)
ANION GAP SERPL CALCULATED.3IONS-SCNC: 8 MMOL/L (ref 7–15)
AST SERPL W P-5'-P-CCNC: 31 U/L (ref 0–45)
BILIRUB SERPL-MCNC: 0.5 MG/DL
BUN SERPL-MCNC: 13.2 MG/DL (ref 6–20)
CALCIUM SERPL-MCNC: 9.7 MG/DL (ref 8.6–10)
CHLORIDE SERPL-SCNC: 107 MMOL/L (ref 98–107)
CHOLEST SERPL-MCNC: 223 MG/DL
CREAT SERPL-MCNC: 1.08 MG/DL (ref 0.67–1.17)
DEPRECATED HCO3 PLAS-SCNC: 27 MMOL/L (ref 22–29)
EGFRCR SERPLBLD CKD-EPI 2021: 86 ML/MIN/1.73M2
ERYTHROCYTE [DISTWIDTH] IN BLOOD BY AUTOMATED COUNT: 12.3 % (ref 10–15)
FASTING STATUS PATIENT QL REPORTED: YES
FASTING STATUS PATIENT QL REPORTED: YES
GLUCOSE SERPL-MCNC: 110 MG/DL (ref 70–99)
HBA1C MFR BLD: 5.3 % (ref 0–5.6)
HCT VFR BLD AUTO: 49.5 % (ref 40–53)
HDLC SERPL-MCNC: 63 MG/DL
HGB BLD-MCNC: 16.9 G/DL (ref 13.3–17.7)
LDLC SERPL CALC-MCNC: 136 MG/DL
MCH RBC QN AUTO: 29.9 PG (ref 26.5–33)
MCHC RBC AUTO-ENTMCNC: 34.1 G/DL (ref 31.5–36.5)
MCV RBC AUTO: 88 FL (ref 78–100)
NONHDLC SERPL-MCNC: 160 MG/DL
PLATELET # BLD AUTO: 172 10E3/UL (ref 150–450)
POTASSIUM SERPL-SCNC: 4.9 MMOL/L (ref 3.4–5.3)
PROT SERPL-MCNC: 7.7 G/DL (ref 6.4–8.3)
RBC # BLD AUTO: 5.65 10E6/UL (ref 4.4–5.9)
SODIUM SERPL-SCNC: 142 MMOL/L (ref 135–145)
TRIGL SERPL-MCNC: 118 MG/DL
WBC # BLD AUTO: 7.4 10E3/UL (ref 4–11)

## 2024-06-10 PROCEDURE — 99396 PREV VISIT EST AGE 40-64: CPT | Mod: 25 | Performed by: FAMILY MEDICINE

## 2024-06-10 PROCEDURE — 90480 ADMN SARSCOV2 VAC 1/ONLY CMP: CPT | Performed by: FAMILY MEDICINE

## 2024-06-10 PROCEDURE — 91320 SARSCV2 VAC 30MCG TRS-SUC IM: CPT | Performed by: FAMILY MEDICINE

## 2024-06-10 PROCEDURE — 90471 IMMUNIZATION ADMIN: CPT | Performed by: FAMILY MEDICINE

## 2024-06-10 PROCEDURE — 80061 LIPID PANEL: CPT | Performed by: FAMILY MEDICINE

## 2024-06-10 PROCEDURE — 83036 HEMOGLOBIN GLYCOSYLATED A1C: CPT | Performed by: FAMILY MEDICINE

## 2024-06-10 PROCEDURE — 90715 TDAP VACCINE 7 YRS/> IM: CPT | Performed by: FAMILY MEDICINE

## 2024-06-10 PROCEDURE — 36415 COLL VENOUS BLD VENIPUNCTURE: CPT | Performed by: FAMILY MEDICINE

## 2024-06-10 PROCEDURE — 80053 COMPREHEN METABOLIC PANEL: CPT | Performed by: FAMILY MEDICINE

## 2024-06-10 PROCEDURE — 85027 COMPLETE CBC AUTOMATED: CPT | Performed by: FAMILY MEDICINE

## 2024-06-10 ASSESSMENT — PAIN SCALES - GENERAL: PAINLEVEL: MILD PAIN (2)

## 2024-06-10 NOTE — RESULT ENCOUNTER NOTE
"Tenzin Helm,    If you have not viewed these results on [employer] within 3 days, we will use an alternative method to contact you. We will contact you via the following protocol:    - Via letter if your results are normal.  - Via phone ([phone]) if your results are abnormal.     Here are my comments about your recent results:    Lipids - Your \"bad\" cholesterol was elevated. This can be improved with diet and exercise. All animal based foods such as meat, dairy, and eggs contain cholesterol. All plant based foods such as fruits, nuts, and vegetables do not.    You do not need a medication for this at this time.    CMP Results - Your blood sugar was 110. Your kidney function, electrolytes, and liver function were normal.      Please call the clinic ([phone]), or message us on [employer] with any questions you may have.     Have a great day,    Dr. Adams"

## 2024-06-10 NOTE — RESULT ENCOUNTER NOTE
Tenzin Helm,    If you have not viewed these results on [employer] within 3 days, we will use an alternative method to contact you. We will contact you via the following protocol:    - Via letter if your results are normal.  - Via phone ([phone]) if your results are abnormal.     Here are my comments about your recent results:    A1c - Your 3 month blood sugar average was normal.    Please call the clinic ([phone]), or message us on [employer] with any questions you may have.     Have a great day,    Dr. Adams

## 2024-06-10 NOTE — RESULT ENCOUNTER NOTE
Tenzin Helm,    If you have not viewed these results on [employer] within 3 days, we will use an alternative method to contact you. We will contact you via the following protocol:    - Via letter if your results are normal.  - Via phone ([phone]) if your results are abnormal.     Here are my comments about your recent results:    CBC Results - Your cell counts were normal.    Please call the clinic ([phone]), or message us on [employer] with any questions you may have.     Have a great day,    Dr. Adams

## 2024-06-10 NOTE — LETTER
"June 13, 2024      Volodymyr Valles  [address] [place] [address]              Volodymyr,     Here are my comments about your recent results:     Lipids - Your \"bad\" cholesterol was elevated. This can be improved with diet and exercise. All animal based foods such as meat, dairy, and eggs contain cholesterol. All plant based foods such as fruits, nuts, and vegetables do not.     You do not need a medication for this at this time.     CMP Results - Your blood sugar was 110. Your kidney function, electrolytes, and liver function were normal.        Please call the clinic ([phone]), or message us on [employer] with any questions you may have.     Have a great day,     Dr. Adams    Recent Results (from the past 240 hour(s))   Lipid panel reflex to direct LDL Non-fasting    Collection Time: 06/10/24 10:09 AM   Result Value Ref Range    Cholesterol 223 (H) <200 mg/dL    Triglycerides 118 <150 mg/dL    Direct Measure HDL 63 >=40 mg/dL    LDL Cholesterol Calculated 136 (H) <=100 mg/dL    Non HDL Cholesterol 160 (H) <130 mg/dL    Patient Fasting > 8hrs? Yes    CBC with platelets    Collection Time: 06/10/24 10:09 AM   Result Value Ref Range    WBC Count 7.4 4.0 - 11.0 10e3/uL    RBC Count 5.65 4.40 - 5.90 10e6/uL    Hemoglobin 16.9 13.3 - 17.7 g/dL    Hematocrit 49.5 40.0 - 53.0 %    MCV 88 78 - 100 fL    MCH 29.9 26.5 - 33.0 pg    MCHC 34.1 31.5 - 36.5 g/dL    RDW 12.3 10.0 - 15.0 %    Platelet Count 172 150 - 450 10e3/uL   Comprehensive metabolic panel    Collection Time: 06/10/24 10:09 AM   Result Value Ref Range    Sodium 142 135 - 145 mmol/L    Potassium 4.9 3.4 - 5.3 mmol/L    Carbon Dioxide (CO2) 27 22 - 29 mmol/L    Anion Gap 8 7 - 15 mmol/L    Urea Nitrogen 13.2 6.0 - 20.0 mg/dL    Creatinine 1.08 0.67 - 1.17 mg/dL    GFR Estimate 86 >60 mL/min/1.73m2    Calcium 9.7 8.6 - 10.0 mg/dL    Chloride 107 98 - 107 mmol/L    Glucose 110 (H) 70 - 99 mg/dL    Alkaline Phosphatase 61 40 - 150 U/L    AST 31 0 - 45 " U/L    ALT 26 0 - 70 U/L    Protein Total 7.7 6.4 - 8.3 g/dL    Albumin 4.7 3.5 - 5.2 g/dL    Bilirubin Total 0.5 <=1.2 mg/dL    Patient Fasting > 8hrs? Yes    Hemoglobin A1c    Collection Time: 06/10/24 10:09 AM   Result Value Ref Range    Hemoglobin A1C 5.3 0.0 - 5.6 %

## 2024-06-10 NOTE — PROGRESS NOTES
Preventive Care Visit  [facility]  David Camacho MD, Family Medicine  Omid 10, 2024    Assessment & Plan   1. Routine history and physical examination of adult  Discussed personal health and safety. Routine screenings ordered as below. Appropriate anticipatory guidance, vaccinations, and health screening recommendations delivered according to the USPSTF and other appropriate society guidelines. Volodymyr reports understanding and in agreement with this mutually agreed upon plan.    Today's Orders:  - COVID-19 12+ (2023-24) (PFIZER)  - TDAP 10-64Y (ADACEL,BOOSTRIX)  - REVIEW OF HEALTH MAINTENANCE PROTOCOL ORDERS  - Lipid panel reflex to direct LDL Non-fasting; Future  - CBC with platelets; Future  - Comprehensive metabolic panel; Future  - Hemoglobin A1c; Future    2. Elevated blood pressure reading without diagnosis of hypertension  Improved on recheck    3. Arachnoid cyst  4. Seizure (H)  Encourage continued routine follow-up with specialty for this condition.  - Adult Neurology  Referral; Future    5. History of Jeanette-Parkinson-White (WPW) syndrome  S/p ablation       Follow-up Visit   Expected date:  Omid 10, 2025 (Approximate)      Follow Up Appointment Details:     Follow-up with whom?: PCP    Follow-Up for what?: Adult Preventive    How?: In Person                   David Camacho MD  [facility]    Disclaimer: This note consists of symbols derived from keyboarding, dictation and/or voice recognition software. As a result, there may be errors in the script that have gone undetected. Please consider this when interpreting information found in this chart.        Caio Helm is a 44 year old, presenting for the following:  Physical        6/10/2024     9:09 AM   Additional Questions   Roomed by Justo YODER   Accompanied by Self        Health Care Directive  Patient does not have a Health Care Directive or Living Will: Discussed advance  care planning with patient; however, patient declined at this time.    HPI  - Neurologist takes care of [place] but would like to have a closer location for any in person appts.   - Was asked recently if he had high cholesterol, would like to have labs done Today to check in on things        6/9/2024   General Health   How would you rate your overall physical health? Good   Feel stress (tense, anxious, or unable to sleep) Very much   (!) STRESS CONCERN      6/9/2024   Nutrition   Three or more servings of calcium each day? Yes   Diet: Regular (no restrictions)   How many servings of fruit and vegetables per day? (!) 0-1   How many sweetened beverages each day? 0-1         6/9/2024   Exercise   Days per week of moderate/strenous exercise 7 days   Average minutes spent exercising at this level 20 min         6/9/2024   Social Factors   Frequency of gathering with friends or relatives Once a week   Worry food won't last until get money to buy more No   Food not last or not have enough money for food? No   Do you have housing?  Yes   Are you worried about losing your housing? No   Lack of transportation? No   Unable to get utilities (heat,electricity)? No         6/9/2024   Dental   Dentist two times every year? Yes         6/9/2024   TB Screening   Were you born outside of the [place]? No     Today's PHQ-2 Score:       6/9/2024     9:44 AM   PHQ-2 ( 1999 [employer])   Q1: Little interest or pleasure in doing things 1   Q2: Feeling down, depressed or hopeless 1   PHQ-2 Score 2   Q1: Little interest or pleasure in doing things Several days   Q2: Feeling down, depressed or hopeless Several days   PHQ-2 Score 2         6/9/2024   Substance Use   Alcohol more than 3/day or more than 7/wk Yes   How often do you have a drink containing alcohol 2 to 3 times a week   How many alcohol drinks on typical day 3 or 4   How often do you have 5+ drinks at one occasion Monthly   Audit 2/3 Score 3   How often not able to stop drinking once  started Monthly   How often failed to do what normally expected Never   How often needed first drink in am after a heavy drinking session Never   How often feeling of guilt or remorse after drinking Less than monthly   How often unable to remember what happened the night before Never   Have you or someone else been injured because of your drinking No   Has anyone been concerned or suggested you cut down on drinking No   TOTAL SCORE - AUDIT 9   Do you use any other substances recreationally? (!) ALCOHOL     Social History     Tobacco Use    Smoking status: Former     Current packs/day: 0.00     Types: Cigarettes     Quit date: 10/1/2012     Years since quittin.6    Smokeless tobacco: Never   Vaping Use    Vaping status: Never Used   Substance Use Topics    Alcohol use: Yes     Alcohol/week: 15.0 standard drinks of alcohol     Types: 15 Cans of beer per week     Comment: rare    Drug use: No         2024   STI Screening   New sexual partner(s) since last STI/HIV test? No   ASCVD Risk   The 10-year ASCVD risk score (Iain MARTIN, et al., 2019) is: 2.8%    Values used to calculate the score:      Age: 44 years      Sex: Male      Is Non- : No      Diabetic: No      Tobacco smoker: No      Systolic Blood Pressure: 143 mmHg      Is BP treated: No      HDL Cholesterol: 48 mg/dL      Total Cholesterol: 196 mg/dL        2024   Contraception/Family Planning   Questions about contraception or family planning No     Reviewed and updated as needed this visit by Provider   Tobacco  Allergies  Meds  Problems  Med Hx  Surg Hx  Fam Hx        Social Hx Reviewed    Past Medical History:   Diagnosis Date    Epidermoid cyst     brain    Seizures (H)     WPW syndrome     recurrent SVT- s/p ablation     Past Surgical History:   Procedure Laterality Date    COLONOSCOPY N/A 2024    Procedure: COLONOSCOPY, WITH POLYPECTOMY AND BIOPSY;  Surgeon: Aziza King DO;  Location:  OR     "COLONOSCOPY WITH CO2 INSUFFLATION N/A 1/9/2024    Procedure: Colonoscopy with CO2 insufflation;  Surgeon: Aziza King DO;  Location: [facility] OR    CRANIOTOMY, EXCISE TUMOR COMPLEX, COMBINED Left 10/7/2019    Procedure: Frontal temporal Approach For Resection Of Tumor;  Surgeon: Shalom Kebede MD;  Location: [facility] OR    CRANIOTOMY, RETROSIGMOID  4/7/2014    Procedure: CRANIOTOMY, RETROSIGMOID;  Left Retrosigmoid Approach, Resection Of Tumor, Ventriculostomy, Left  hip Fat Hagerman ;  Surgeon: Shalom Kebede MD;  Location: [facility] OR    HC REPAIR SLIDING INGUINAL HERNIA  2003    left    HERNIORRHAPHY UMBILICAL N/A 3/13/2017    Procedure: HERNIORRHAPHY UMBILICAL;  Surgeon: Africa Whittington MD;  Location: [facility]    LAPAROSCOPIC HERNIORRHAPHY INGUINAL Right 3/13/2017    Procedure: LAPAROSCOPIC HERNIORRHAPHY INGUINAL;  Surgeon: Africa Whittington MD;  Location: [facility]    PROCURE GRAFT FAT  4/7/2014    Procedure: PROCURE GRAFT FAT;;  Surgeon: Shalom Kebede MD;  Location: [facility] OR    VENTRICULOSTOMY  4/7/2014    Procedure: VENTRICULOSTOMY;;  Surgeon: Shalom Kebede MD;  Location: [facility] OR    ZZC ABLATION SUPRAVENT ARRHYTHMOGENIC FOCUS  1993     Review of Systems  Constitutional, HEENT, cardiovascular, pulmonary, GI, , musculoskeletal, neuro, skin, endocrine and psych systems are negative, except as otherwise noted.     Objective    Exam  BP (!) 143/89 (BP Location: Right arm, Patient Position: Sitting, Cuff Size: Adult Regular)   Pulse 61   Temp 97.9  F (36.6  C) (Temporal)   Ht 1.855 m (6' 1.03\")   Wt 105.7 kg (233 lb)   SpO2 95%   BMI 30.71 kg/m     Estimated body mass index is 30.71 kg/m  as calculated from the following:    Height as of this encounter: 1.855 m (6' 1.03\").    Weight as of this encounter: 105.7 kg (233 lb).    Physical Exam  HENT:      Head: Normocephalic and atraumatic.      Right Ear: Tympanic membrane, ear canal and external ear normal. There is no " impacted cerumen.      Left Ear: Tympanic membrane, ear canal and external ear normal. There is no impacted cerumen.      Nose: Nose normal. No congestion.      Mouth/Throat:      Pharynx: Oropharynx is clear. No oropharyngeal exudate or posterior oropharyngeal erythema.   Eyes:      General:         Right eye: No discharge.         Left eye: No discharge.      Extraocular Movements: Extraocular movements intact.      Conjunctiva/sclera: Conjunctivae normal.      Pupils: Pupils are equal, round, and reactive to light.   Cardiovascular:      Rate and Rhythm: Normal rate and regular rhythm.      Heart sounds: Normal heart sounds. No murmur heard.     No friction rub.   Pulmonary:      Effort: Pulmonary effort is normal. No respiratory distress.      Breath sounds: Normal breath sounds. No wheezing or rhonchi.   Abdominal:      General: Abdomen is flat. There is no distension.      Palpations: Abdomen is soft. There is no mass.      Tenderness: There is no abdominal tenderness. There is no guarding.   Musculoskeletal:         General: No swelling.      Cervical back: Normal range of motion and neck supple. No tenderness.      Right lower leg: No edema.      Left lower leg: No edema.   Lymphadenopathy:      Cervical: No cervical adenopathy.   Skin:     General: Skin is warm.      Findings: No rash.   Neurological:      General: No focal deficit present.      Mental Status: He is alert.      Cranial Nerves: No cranial nerve deficit.      Motor: No weakness.      Coordination: Coordination normal.      Gait: Gait normal.   Psychiatric:         Mood and Affect: Mood normal.         Behavior: Behavior normal.         Thought Content: Thought content normal.         Judgment: Judgment normal.     Labs: pending    Signed Electronically by: David Camacho MD

## 2024-06-10 NOTE — PATIENT INSTRUCTIONS
"Preventive Care Advice   This is general advice we often give to help people stay healthy. Your care team may have specific advice just for you. Please talk to your care team about your own preventive care needs.  Lifestyle  Exercise at least 150 minutes each week (30 minutes a day, 5 days a week).  Do muscle strengthening activities 2 days a week. These help control your weight and prevent disease.  No smoking.  Wear sunscreen to prevent skin cancer.  Have your home tested for radon every 2 to 5 years. Radon is a colorless, odorless gas that can harm your lungs. To learn more, go to www.health.[facility].mn. and search for \"Radon in Homes.\"  Keep guns unloaded and locked up in a safe place like a safe or gun vault, or, use a gun lock and hide the keys. Always lock away bullets separately. To learn more, visit [employer].[place].gov and search for \"safe gun storage.\"  Nutrition  Eat 5 or more servings of fruits and vegetables each day.  Try wheat bread, brown rice and whole grain pasta (instead of white bread, rice, and pasta).  Get enough calcium and vitamin D. Check the label on foods and aim for 100% of the RDA (recommended daily allowance).  Regular exams  Have a dental exam and cleaning every 6 months.  See your health care team every year to talk about:  Any changes in your health.  Any medicines your care team has prescribed.  Preventive care, family planning, and ways to prevent chronic diseases.  Shots (vaccines)   HPV shots (up to age 26), if you've never had them before.  Hepatitis B shots (up to age 59), if you've never had them before.  COVID-19 shot: Get this shot when it's due.  Flu shot: Get a flu shot every year.  Tetanus shot: Get a tetanus shot every 10 years.  Pneumococcal, hepatitis A, and RSV shots: Ask your care team if you need these based on your risk.  Shingles shot (for age 50 and up).  General health tests  Diabetes screening:  Starting at age 35, Get screened for diabetes at least every 3 years.  If " you are younger than age 35, ask your care team if you should be screened for diabetes.  Cholesterol test: At age 39, start having a cholesterol test every 5 years, or more often if advised.  Bone density scan (DEXA): At age 50, ask your care team if you should have this scan for osteoporosis (brittle bones).  Hepatitis C: Get tested at least once in your life.  Abdominal aortic aneurysm screening: Talk to your doctor about having this screening if you:  Have ever smoked; and  Are biologically male; and  Are between the ages of 65 and 75.  STIs (sexually transmitted infections)  Before age 24: Ask your care team if you should be screened for STIs.  After age 24: Get screened for STIs if you're at risk. You are at risk for STIs (including HIV) if:  You are sexually active with more than one person.  You don't use condoms every time.  You or a partner was diagnosed with a sexually transmitted infection.  If you are at risk for HIV, ask about PrEP medicine to prevent HIV.  Get tested for HIV at least once in your life, whether you are at risk for HIV or not.  Cancer screening tests  Cervical cancer screening: If you have a cervix, begin getting regular cervical cancer screening tests at age 21. Most people who have regular screenings with normal results can stop after age 65. Talk about this with your provider.  Breast cancer scan (mammogram): If you've ever had breasts, begin having regular mammograms starting at age 40. This is a scan to check for breast cancer.  Colon cancer screening: It is important to start screening for colon cancer at age 45.  Have a colonoscopy test every 10 years (or more often if you're at risk) Or, ask your provider about stool tests like a FIT test every year or Cologuard test every 3 years.  To learn more about your testing options, visit: [email or web address]/074759.pdf.  For help making a decision, visit: todd/nc84076.  Prostate cancer screening test: If you have a prostate and are age 55  to 69, ask your provider if you would benefit from a yearly prostate cancer screening test.  Lung cancer screening: If you are a current or former smoker age 50 to 80, ask your care team if ongoing lung cancer screenings are right for you.  For informational purposes only. Not to replace the advice of your health care provider. Copyright   2023 [employer]. All rights reserved. Clinically reviewed by the [facility] Transitions Program. "[employer] 389960 - REV 04/24.    Learning About Stress  What is stress?     Stress is your body's response to a hard situation. Your body can have a physical, emotional, or mental response. Stress is a fact of life for most people, and it affects everyone differently. What causes stress for you may not be stressful for someone else.  A lot of things can cause stress. You may feel stress when you go on a job interview, take a test, or run a race. This kind of short-term stress is normal and even useful. It can help you if you need to work hard or react quickly. For example, stress can help you finish an important job on time.  Long-term stress is caused by ongoing stressful situations or events. Examples of long-term stress include long-term health problems, ongoing problems at work, or conflicts in your family. Long-term stress can harm your health.  How does stress affect your health?  When you are stressed, your body responds as though you are in danger. It makes hormones that speed up your heart, make you breathe faster, and give you a burst of energy. This is called the fight-or-flight stress response. If the stress is over quickly, your body goes back to normal and no harm is done.  But if stress happens too often or lasts too long, it can have bad effects. Long-term stress can make you more likely to get sick, and it can make symptoms of some diseases worse. If you tense up when you are stressed, you may develop neck, shoulder, or low back pain. Stress is  linked to high blood pressure and heart disease.  Stress also harms your emotional health. It can make you joya, tense, or depressed. Your relationships may suffer, and you may not do well at work or school.  What can you do to manage stress?  You can try these things to help manage stress:   Do something active. Exercise or activity can help reduce stress. Walking is a great way to get started. Even everyday activities such as housecleaning or yard work can help.  Try yoga or ron chi. These techniques combine exercise and meditation. You may need some training at first to learn them.  Do something you enjoy. For example, listen to music or go to a movie. Practice your hobby or do volunteer work.  Meditate. This can help you relax, because you are not worrying about what happened before or what may happen in the future.  Do guided imagery. Imagine yourself in any setting that helps you feel calm. You can use online videos, books, or a teacher to guide you.  Do breathing exercises. For example:  From a standing position, bend forward from the waist with your knees slightly bent. Let your arms dangle close to the floor.  Breathe in slowly and deeply as you return to a standing position. Roll up slowly and lift your head last.  Hold your breath for just a few seconds in the standing position.  Breathe out slowly and bend forward from the waist.  Let your feelings out. Talk, laugh, cry, and express anger when you need to. Talking with supportive friends or family, a counselor, or a irving leader about your feelings is a healthy way to relieve stress. Avoid discussing your feelings with people who make you feel worse.  Write. It may help to write about things that are bothering you. This helps you find out how much stress you feel and what is causing it. When you know this, you can find better ways to cope.  What can you do to prevent stress?  You might try some of these things to help prevent stress:  Manage your time.  "This helps you find time to do the things you want and need to do.  Get enough sleep. Your body recovers from the stresses of the day while you are sleeping.  Get support. Your family, friends, and community can make a difference in how you experience stress.  Limit your news feed. Avoid or limit time on social media or news that may make you feel stressed.  Do something active. Exercise or activity can help reduce stress. Walking is a great way to get started.  Where can you learn more?  Go to https://www.MediaVast.net/patiented  Enter N032 in the search box to learn more about \"Learning About Stress.\"  Current as of: October 24, 2023               Content Version: 14.0    5855-7897 [employer].   Care instructions adapted under license by your healthcare professional. If you have questions about a medical condition or this instruction, always ask your healthcare professional. [employer] disclaims any warranty or liability for your use of this information.      Learning About Alcohol Use Disorder  What is alcohol use disorder?  Alcohol use disorder means that a person drinks alcohol even though it causes harm to themselves or others. It can range from mild to severe. The more symptoms of this disorder you have, the more severe it may be. People who have it may find it hard to control their use of alcohol.  People who have this disorder may argue with others about how much they're drinking. Their job may be affected because of drinking. They may drink when it's dangerous or illegal, such as when they drive. They also may have a strong need, or craving, to drink. They may feel like they must drink just to get by. Their drinking may increase their risk of getting hurt or being in a car crash.  Over time, drinking too much alcohol may cause health problems. These may include high blood pressure, liver problems, or problems with digestion.  What are the symptoms?  Maybe you've wondered about " your alcohol habits or how to tell if your drinking is becoming a problem.  Here are some of the symptoms of alcohol use disorder. You may have it if you have two or more of the following symptoms:  You drink larger amounts of alcohol than you ever meant to. Or you've been drinking for a longer time than you ever meant to.  You can't cut down or control your use. Or you constantly wish you could cut down.  You spend a lot of time getting or drinking alcohol or recovering from its effects.  You have strong cravings for alcohol.  You can no longer do your main jobs at work, at school, or at home.  You keep drinking alcohol, even though your use hurts your relationships.  You have stopped doing important activities because of your alcohol use.  You drink alcohol in situations where doing so is dangerous.  You keep drinking alcohol even though you know it's causing health problems.  You need more and more alcohol to get the same effect, or you get less effect from the same amount over time. This is called tolerance.  You have uncomfortable symptoms when you stop drinking alcohol or use less. This is called withdrawal.  Alcohol use disorder can range from mild to severe. The more symptoms you have, the more severe the disorder may be.  You might not realize that your drinking is a problem. You might not drink large amounts when you drink. Or you might go for days or weeks between drinking episodes. But even if you don't drink very often, your drinking could still be harmful and put you at risk.  How is alcohol use disorder treated?  Getting help is up to you. But you don't have to do it alone. There are many people and kinds of treatments that can help.  Treatment for alcohol use disorder can include:  Group therapy, one or more types of counseling, and alcohol education.  Medicines that help to:  Reduce withdrawal symptoms and help you safely stop drinking.  Reduce cravings for alcohol.  Support groups. These groups  "include Alcoholics Anonymous and SMART Recovery (Self-Management and Recovery Training).  Some people are able to stop or cut back on drinking with help from a counselor. People who have moderate to severe alcohol use disorder may need medical treatment. They may need to stay in a hospital or treatment center.  You may have a treatment team to help you. This team may include a psychologist or psychiatrist, counselors, doctors, social workers, nurses, and a . A  helps plan and manage your treatment.  Follow-up care is a key part of your treatment and safety. Be sure to make and go to all appointments, and call your doctor if you are having problems. It's also a good idea to know your test results and keep a list of the medicines you take.  Where can you learn more?  Go to https://www.MediaTrove.net/patiented  Enter H758 in the search box to learn more about \"Learning About Alcohol Use Disorder.\"  Current as of: November 15, 2023               Content Version: 14.0    [id number] [employer].   Care instructions adapted under license by your healthcare professional. If you have questions about a medical condition or this instruction, always ask your healthcare professional. [employer] disclaims any warranty or liability for your use of this information.      Substance Use Disorder: Care Instructions  Overview     You can improve your life and health by stopping your use of alcohol or drugs. When you don't drink or use drugs, you may feel and sleep better. You may get along better with your family, friends, and coworkers. There are medicines and programs that can help with substance use disorder.  How can you care for yourself at home?  Here are some ways to help you stay sober and prevent relapse.  If you have been given medicine to help keep you sober or reduce your cravings, be sure to take it exactly as prescribed.  Talk to your doctor about programs that can help " you stop using drugs or drinking alcohol.  Do not keep alcohol or drugs in your home.  Plan ahead. Think about what you'll say if other people ask you to drink or use drugs. Try not to spend time with people who drink or use drugs.  Use the time and money spent on drinking or drugs to do something that's important to you.  Preventing a relapse  Have a plan to deal with relapse. Learn to recognize changes in your thinking that lead you to drink or use drugs. Get help before you start to drink or use drugs again.  Try to stay away from situations, friends, or places that may lead you to drink or use drugs.  If you feel the need to drink alcohol or use drugs again, seek help right away. Call a trusted friend or family member. Some people get support from organizations such as Narcotics Anonymous or [facility] or from treatment facilities.  If you relapse, get help as soon as you can. Some people make a plan with another person that outlines what they want that person to do for them if they relapse. The plan usually includes how to handle the relapse and who to notify in case of relapse.  Don't give up. Remember that a relapse doesn't mean that you have failed. Use the experience to learn the triggers that lead you to drink or use drugs. Then quit again. Recovery is a lifelong process. Many people have several relapses before they are able to quit for good.  Follow-up care is a key part of your treatment and safety. Be sure to make and go to all appointments, and call your doctor if you are having problems. It's also a good idea to know your test results and keep a list of the medicines you take.  When should you call for help?   Call 911  anytime you think you may need emergency care. For example, call if you or someone else:    Has overdosed or has withdrawal signs. Be sure to tell the emergency workers that you are or someone else is using or trying to quit using drugs. Overdose or withdrawal signs may  "include:  Losing consciousness.  Seizure.  Seeing or hearing things that aren't there (hallucinations).     Is thinking or talking about suicide or harming others.   Where to get help 24 hours a day, 7 days a week   If you or someone you know talks about suicide, self-harm, a mental health crisis, a substance use crisis, or any other kind of emotional distress, get help right away. You can:    Call the Suicide and Crisis Lifeline at 988.     Call 5-982-786-TALK (1-785.979.4223).     Text HOME to 895732 to access the Crisis Text Line.   Consider saving these numbers in your phone.  Go to [employer] for more information or to chat online.  Call your doctor now or seek immediate medical care if:    You are having withdrawal symptoms. These may include nausea or vomiting, sweating, shakiness, and anxiety.   Watch closely for changes in your health, and be sure to contact your doctor if:    You have a relapse.     You need more help or support to stop.   Where can you learn more?  Go to https://www.[employer].net/patiented  Enter H573 in the search box to learn more about \"Substance Use Disorder: Care Instructions.\"  Current as of: November 15, 2023               Content Version: 14.0    7648-8593 [employer].   Care instructions adapted under license by your healthcare professional. If you have questions about a medical condition or this instruction, always ask your healthcare professional. [employer] disclaims any warranty or liability for your use of this information.      "

## 2024-08-29 ENCOUNTER — ANCILLARY PROCEDURE (OUTPATIENT)
Dept: NEUROLOGY | Facility: CLINIC | Age: 44
End: 2024-08-29
Payer: COMMERCIAL

## 2024-08-29 DIAGNOSIS — R56.9 SEIZURES (H): ICD-10-CM

## 2024-09-19 NOTE — TELEPHONE ENCOUNTER
REASON FOR VISIT: Seizures    DATE OF APPT: 9/20/2024   NOTES (FOR ALL VISITS) STATUS DETAILS   OFFICE NOTE from referring provider Internal [facility] David Liu MD 6/10/2024   MEDICATION LIST N/A    IMAGING  (FOR ALL VISITS)     XR N/A    MRI (HEAD, NECK, SPINE) Internal [facility]  MRI Brain 12/04/2023   CT (HEAD, NECK, SPINE) N/A

## 2024-09-20 ENCOUNTER — OFFICE VISIT (OUTPATIENT)
Dept: NEUROLOGY | Facility: CLINIC | Age: 44
End: 2024-09-20
Payer: COMMERCIAL

## 2024-09-20 ENCOUNTER — PRE VISIT (OUTPATIENT)
Dept: NEUROLOGY | Facility: CLINIC | Age: 44
End: 2024-09-20

## 2024-09-20 VITALS
BODY MASS INDEX: 30.62 KG/M2 | SYSTOLIC BLOOD PRESSURE: 123 MMHG | OXYGEN SATURATION: 97 % | HEART RATE: 55 BPM | HEIGHT: 73 IN | DIASTOLIC BLOOD PRESSURE: 82 MMHG | WEIGHT: 231 LBS

## 2024-09-20 DIAGNOSIS — G93.0 ARACHNOID CYST: ICD-10-CM

## 2024-09-20 DIAGNOSIS — R56.9 SEIZURE (H): ICD-10-CM

## 2024-09-20 DIAGNOSIS — G40.109 TEMPORAL LOBE EPILEPSY (H): Primary | ICD-10-CM

## 2024-09-20 LAB — LEVETIRACETAM SERPL-MCNC: 26.2 ΜG/ML (ref 10–40)

## 2024-09-20 PROCEDURE — G2211 COMPLEX E/M VISIT ADD ON: HCPCS | Performed by: PSYCHIATRY & NEUROLOGY

## 2024-09-20 PROCEDURE — 80177 DRUG SCRN QUAN LEVETIRACETAM: CPT | Performed by: PSYCHIATRY & NEUROLOGY

## 2024-09-20 PROCEDURE — 99205 OFFICE O/P NEW HI 60 MIN: CPT | Performed by: PSYCHIATRY & NEUROLOGY

## 2024-09-20 PROCEDURE — 36415 COLL VENOUS BLD VENIPUNCTURE: CPT | Performed by: PSYCHIATRY & NEUROLOGY

## 2024-09-20 NOTE — NURSING NOTE
"Volodymyr Valles is a 44 year old male who presents for:  Chief Complaint   Patient presents with    Consult     New care from [facility] to us        Initial Vitals:  /82   Pulse 55   Ht 1.854 m (6' 1\")   Wt 104.8 kg (231 lb)   SpO2 97%   BMI 30.48 kg/m   Estimated body mass index is 30.48 kg/m  as calculated from the following:    Height as of this encounter: 1.854 m (6' 1\").    Weight as of this encounter: 104.8 kg (231 lb).. Body surface area is 2.32 meters squared. BP completed using cuff size: cris Kaplan    "

## 2024-09-20 NOTE — PROGRESS NOTES
Neurology History and Physical Exam  [facility]      Patient:Volodymyr Valles  : 1980  Age: 44 year old  Today's Visit: 2024      History of present illness:   Mr. 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      patient has a remote history of childhood seizures and was on phenobarbital for maybe 1 year and then he outgrew seizures and the medication discontinued.  He doesn't have much details of these seizures. He does not recall any seizures as a child.    He had an episode of loss of consciousness when he was 13 or 14 years old.  He was getting up from a chair, he felt dizzy and everything went black and he felt like he was spinning and then he fell.  He was out for approximately 10 seconds.  He had a brain MRI and was told to have an arachnoid cyst.    In early , patient was having occipital headaches, exacerbated by Valsalva maneuvers.  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  He saw Dr. Shalom Kebede and had surgery for tumor resection in 2014--left frontotemporal craniotomy with resection of the left CP angle mass.      The patient has been experiencing déjà vu starting approximately 3 years prior to his first surgery in .  These were happening once or twice a month.  He had no other symptoms or altered awareness, losing time or confusion.    His first known seizure happened about 5 years ago.  He fell and had multiple facial fractures including his nose, jaw, and had intracranial hemorrhage.  Images showed that supratentorial component of the epidermoid cyst grew slightly over the years.   Therefore on 10/7/2019 he had a second surgery--left  "frontotemporal craniotomy and resection of epidermoid cyst.      He was started on levetiracetam 2000 mg/d. He denies side effects.  He had no seizures with loss of awareness since starting on Keppra.  He has sporadic déjà vu. He can't quantify, but he indicates they happen very rarely and don't impact him.     Past Medical History:  WPW, s/p ablation, epidermoid cyst in posterior fossa, s/p subtotal resection x2 and epilepsy.    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    3 hr vEEG 8/29/2024:  This video electroencephalogram is abnormal due to the presence of intermittent diffuse theta slowing and left temporal epileptiform discharges, consistent with mild diffuse nonspecific encephalopathy and focal left temporal cortical irritability.     Current Outpatient Medications   Medication Sig Dispense Refill    levETIRAcetam (KEPPRA) 500 MG tablet Take 1,000 mg by mouth 2 times daily          Exam:    /82   Pulse 55   Ht 1.854 m (6' 1\")   Wt 104.8 kg (231 lb)   SpO2 97%   BMI 30.48 kg/m       Wt Readings from Last 5 Encounters:   09/20/24 104.8 kg (231 lb)   06/10/24 105.7 kg (233 lb)   06/08/23 105 kg (231 lb 8 oz)   10/21/19 97.1 kg (214 lb)   10/01/19 99.3 kg (219 lb)     GENERAL APPEARANCE:  Alert, awake, cooperative, in no apparent distress.      NEUROLOGICAL EXAMINATION:   MENTAL STATUS:  Alert and oriented.    LANGUAGE/SPEECH:  No aphasia or dysarthria.   CRANIAL NERVES:  Pupils are round and reactive to light.  Extraocular movements are intact.  Facial sensation is intact to light touch.  Face is symmetric.  Hearing is intact to voice.   MOTOR:  Normal tone, bulk and strength 5/5 with no pronator drift.   SENSATION:  Intact to light " touch  COORDINATION:  Normal finger to nose.  No dysmetria or tremor.   REFLEXES:  DTRs 2+ symmetric.    GAIT:  Gait and tandem gait are steady.     Assessment/Plan:    Temporal lobe epilepsy: the patient has a history of a large epidermoid cyst involving multiple supratentorial cisterns, as well as the posterior fossa.  He underwent a left retrosigmoid approach for resection of the posterior fossa component about 5 years ago.  The supratentorial component has grown slightly over the years and he recently developed a first-time seizure.  He was started on levetiracetam and was maintained on levetiracetam 1000 mg bid.  He had no further seizures and and has been tolerating the medication well.    [place] regulations on seizures and driving were reviewed with the patient.  The patient clearly understands that she/he is prohibited from operating a motor vehicle within 3 months following any seizure (that will impair control of car) or other episode with sudden unconsciousness or inability to sit up, and that she/he is required to report this most recent seizure to the DMV within 30 days after the event.    Avoid any activities that might lead to self-injury or injury of others, within 3 months following any seizure with impaired awareness or impaired motor control such activities include but are not limited to operating power tools, operating firearms, climbing ladders/trees/exposure to heights from which he might fall, exposure to vessels with hot cooking oil or water, and swimming alone.      - Continue levetiracetam as before    - Obtain levetiracetam level    - Follow up in 6 months      As described above, I met with the patient for ~45 minutes and during this time counseling was greater than 50% of the visit time. This note was created in part by the use of Dragon voice recognition system. Inadvertent grammatical errors and typographical errors may still exist.  Radha Dorantes MD

## 2024-09-20 NOTE — LETTER
2024      Volodymyr Valles  [address] [place] [address]      Dear Colleague,    Thank you for referring your patient, Volodymyr Valles, to the [facility] [place]. Please see a copy of my visit note below.    Neurology History and Physical Exam  [facility]      Patient:Volodymyr Valles  : 1980  Age: 44 year old  Today's Visit: 2024      History of present illness:   Mr. 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      patient has a remote history of childhood seizures and was on phenobarbital for maybe 1 year and then he outgrew seizures and the medication discontinued.  He doesn't have much details of these seizures. He does not recall any seizures as a child.    He had an episode of loss of consciousness when he was 13 or 14 years old.  He was getting up from a chair, he felt dizzy and everything went black and he felt like he was spinning and then he fell.  He was out for approximately 10 seconds.  He had a brain MRI and was told to have an arachnoid cyst.    In early , patient was having occipital headaches, exacerbated by Valsalva maneuvers.  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  He saw Dr. Shalom Kebede and had surgery for tumor resection in 2014--left frontotemporal craniotomy with resection of the left CP angle mass.      The patient has been experiencing déjà vu starting approximately 3 years prior to his first surgery in .  These were happening once or twice a month.  He had no other symptoms or altered awareness, losing time or confusion.    His first known seizure happened about 5 years ago.   "He fell and had multiple facial fractures including his nose, jaw, and had intracranial hemorrhage.  Images showed that supratentorial component of the epidermoid cyst grew slightly over the years.   Therefore on 10/7/2019 he had a second surgery--left frontotemporal craniotomy and resection of epidermoid cyst.      He was started on levetiracetam 2000 mg/d. He denies side effects.  He had no seizures with loss of awareness since starting on Keppra.  He has sporadic déjà vu. He can't quantify, but he indicates they happen very rarely and don't impact him.     Past Medical History:  WPW, s/p ablation, epidermoid cyst in posterior fossa, s/p subtotal resection x2 and epilepsy.    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    3 hr vEEG 8/29/2024:  This video electroencephalogram is abnormal due to the presence of intermittent diffuse theta slowing and left temporal epileptiform discharges, consistent with mild diffuse nonspecific encephalopathy and focal left temporal cortical irritability.     Current Outpatient Medications   Medication Sig Dispense Refill     levETIRAcetam (KEPPRA) 500 MG tablet Take 1,000 mg by mouth 2 times daily          Exam:    /82   Pulse 55   Ht 1.854 m (6' 1\")   Wt 104.8 kg (231 lb)   SpO2 97%   BMI 30.48 kg/m       Wt Readings from Last 5 Encounters:   09/20/24 104.8 kg (231 lb)   06/10/24 105.7 kg (233 lb)   06/08/23 105 kg (231 lb 8 oz)   10/21/19 97.1 kg (214 lb)   10/01/19 99.3 kg (219 lb)     GENERAL APPEARANCE:  Alert, awake, cooperative, in no apparent distress.      NEUROLOGICAL EXAMINATION:   MENTAL STATUS:  Alert and oriented.    LANGUAGE/SPEECH:  No aphasia or dysarthria.   CRANIAL NERVES:  Pupils are round and reactive " to light.  Extraocular movements are intact.  Facial sensation is intact to light touch.  Face is symmetric.  Hearing is intact to voice.   MOTOR:  Normal tone, bulk and strength 5/5 with no pronator drift.   SENSATION:  Intact to light touch  COORDINATION:  Normal finger to nose.  No dysmetria or tremor.   REFLEXES:  DTRs 2+ symmetric.    GAIT:  Gait and tandem gait are steady.     Assessment/Plan:    Temporal lobe epilepsy: the patient has a history of a large epidermoid cyst involving multiple supratentorial cisterns, as well as the posterior fossa.  He underwent a left retrosigmoid approach for resection of the posterior fossa component about 5 years ago.  The supratentorial component has grown slightly over the years and he recently developed a first-time seizure.  He was started on levetiracetam and was maintained on levetiracetam 1000 mg bid.  He had no further seizures and and has been tolerating the medication well.    [place] regulations on seizures and driving were reviewed with the patient.  The patient clearly understands that she/he is prohibited from operating a motor vehicle within 3 months following any seizure (that will impair control of car) or other episode with sudden unconsciousness or inability to sit up, and that she/he is required to report this most recent seizure to the DMV within 30 days after the event.    Avoid any activities that might lead to self-injury or injury of others, within 3 months following any seizure with impaired awareness or impaired motor control such activities include but are not limited to operating power tools, operating firearms, climbing ladders/trees/exposure to heights from which he might fall, exposure to vessels with hot cooking oil or water, and swimming alone.      - Continue levetiracetam as before    - Obtain levetiracetam level    - Follow up in 6 months      As described above, I met with the patient for ~45 minutes and during this time counseling was  greater than 50% of the visit time. This note was created in part by the use of Dragon voice recognition system. Inadvertent grammatical errors and typographical errors may still exist.  Radha Dorantes MD

## 2024-09-28 RX ORDER — LEVETIRACETAM 500 MG/1
1000 TABLET ORAL 2 TIMES DAILY
Qty: 180 TABLET | Refills: 3 | Status: SHIPPED | OUTPATIENT
Start: 2024-09-28

## 2024-09-30 ENCOUNTER — TELEPHONE (OUTPATIENT)
Dept: NEUROSURGERY | Facility: CLINIC | Age: 44
End: 2024-09-30
Payer: COMMERCIAL

## 2024-09-30 NOTE — TELEPHONE ENCOUNTER
Patient confirmed scheduled appointment:  Date: 12/20/2024  Time: 8:30 am  Visit type: Return phone visit  Provider: Deyanira De Los Santos  Location: [facility]  Testing/imaging: MRI

## 2024-12-04 DIAGNOSIS — F40.240 CLAUSTROPHOBIA: Primary | ICD-10-CM

## 2024-12-04 RX ORDER — ALPRAZOLAM 0.5 MG
0.5 TABLET ORAL 3 TIMES DAILY PRN
Qty: 2 TABLET | Refills: 0 | Status: SHIPPED | OUTPATIENT
Start: 2024-12-04

## 2024-12-04 NOTE — TELEPHONE ENCOUNTER
Patient has an MRI scheduled on Monday.  He is needing to have ALPRAZolam (XANAX) 0.5 MG tablet sent to the pharmacy.    MRI at [place] on Monday, December 4    Patricia BOSTONO/

## 2024-12-17 ENCOUNTER — ANCILLARY PROCEDURE (OUTPATIENT)
Dept: MRI IMAGING | Facility: CLINIC | Age: 44
End: 2024-12-17
Attending: NEUROLOGICAL SURGERY
Payer: COMMERCIAL

## 2024-12-17 DIAGNOSIS — G93.0 EPIDERMOID CYST OF BRAIN: ICD-10-CM

## 2024-12-17 PROCEDURE — 70551 MRI BRAIN STEM W/O DYE: CPT | Performed by: STUDENT IN AN ORGANIZED HEALTH CARE EDUCATION/TRAINING PROGRAM

## 2025-05-08 ENCOUNTER — TRANSFERRED RECORDS (OUTPATIENT)
Dept: HEALTH INFORMATION MANAGEMENT | Facility: CLINIC | Age: 45
End: 2025-05-08
Payer: COMMERCIAL

## 2025-05-09 ENCOUNTER — TRANSFERRED RECORDS (OUTPATIENT)
Dept: HEALTH INFORMATION MANAGEMENT | Facility: CLINIC | Age: 45
End: 2025-05-09
Payer: COMMERCIAL

## 2025-05-12 ENCOUNTER — PATIENT OUTREACH (OUTPATIENT)
Dept: CARE COORDINATION | Facility: CLINIC | Age: 45
End: 2025-05-12
Payer: COMMERCIAL

## 2025-05-12 ENCOUNTER — TRANSFERRED RECORDS (OUTPATIENT)
Dept: HEALTH INFORMATION MANAGEMENT | Facility: CLINIC | Age: 45
End: 2025-05-12
Payer: COMMERCIAL

## 2025-06-13 ENCOUNTER — RESULTS FOLLOW-UP (OUTPATIENT)
Dept: FAMILY MEDICINE | Facility: CLINIC | Age: 45
End: 2025-06-13

## 2025-06-16 ENCOUNTER — PATIENT OUTREACH (OUTPATIENT)
Dept: CARE COORDINATION | Facility: CLINIC | Age: 45
End: 2025-06-16
Payer: COMMERCIAL

## 2025-06-18 ENCOUNTER — PATIENT OUTREACH (OUTPATIENT)
Dept: CARE COORDINATION | Facility: CLINIC | Age: 45
End: 2025-06-18
Payer: COMMERCIAL

## 2025-06-30 ENCOUNTER — OFFICE VISIT (OUTPATIENT)
Dept: OPHTHALMOLOGY | Facility: CLINIC | Age: 45
End: 2025-06-30
Attending: FAMILY MEDICINE
Payer: COMMERCIAL

## 2025-06-30 DIAGNOSIS — H52.203 MYOPIC ASTIGMATISM OF BOTH EYES: ICD-10-CM

## 2025-06-30 DIAGNOSIS — H53.2 DOUBLE VISION: Primary | ICD-10-CM

## 2025-06-30 DIAGNOSIS — H52.13 MYOPIC ASTIGMATISM OF BOTH EYES: ICD-10-CM

## 2025-06-30 DIAGNOSIS — H49.12 LEFT-SIDED FOURTH CRANIAL NERVE PALSY: ICD-10-CM

## 2025-06-30 PROCEDURE — 99214 OFFICE O/P EST MOD 30 MIN: CPT | Performed by: OPHTHALMOLOGY

## 2025-06-30 PROCEDURE — 92015 DETERMINE REFRACTIVE STATE: CPT

## 2025-06-30 PROCEDURE — 92060 SENSORIMOTOR EXAMINATION: CPT | Performed by: OPHTHALMOLOGY

## 2025-06-30 PROCEDURE — 92004 COMPRE OPH EXAM NEW PT 1/>: CPT | Performed by: OPHTHALMOLOGY

## 2025-06-30 ASSESSMENT — EXTERNAL EXAM - LEFT EYE: OS_EXAM: NORMAL

## 2025-06-30 ASSESSMENT — REFRACTION
OS_CYLINDER: +0.50
OD_SPHERE: -4.25
OS_AXIS: 010
OD_CYLINDER: +1.00
OD_AXIS: 180
OS_SPHERE: -4.00

## 2025-06-30 ASSESSMENT — REFRACTION_WEARINGRX
OD_SPHERE: -3.50
OD_AXIS: 003
OS_SPHERE: -3.50
OD_CYLINDER: +0.50
OS_CYLINDER: +0.50
OS_AXIS: 012

## 2025-06-30 ASSESSMENT — VISUAL ACUITY
CORRECTION_TYPE: GLASSES
OS_CC: 20/30
OD_CC+: +2
OD_CC: 20/25
METHOD: SNELLEN - LINEAR
OS_CC+: +2
OS_PH_CC: 20/20
OD_PH_CC: 20/20

## 2025-06-30 ASSESSMENT — CONF VISUAL FIELD
OD_INFERIOR_TEMPORAL_RESTRICTION: 0
OS_INFERIOR_TEMPORAL_RESTRICTION: 0
OD_SUPERIOR_NASAL_RESTRICTION: 0
OS_NORMAL: 1
OS_SUPERIOR_NASAL_RESTRICTION: 0
OD_INFERIOR_NASAL_RESTRICTION: 0
OS_SUPERIOR_TEMPORAL_RESTRICTION: 0
OD_SUPERIOR_TEMPORAL_RESTRICTION: 0
OD_NORMAL: 1
OS_INFERIOR_NASAL_RESTRICTION: 0

## 2025-06-30 ASSESSMENT — CUP TO DISC RATIO
OD_RATIO: 0.3
OS_RATIO: 0.3

## 2025-06-30 ASSESSMENT — TONOMETRY
OD_IOP_MMHG: 16
OS_IOP_MMHG: 17
IOP_METHOD: ICARE

## 2025-06-30 ASSESSMENT — SLIT LAMP EXAM - LIDS
COMMENTS: NORMAL
COMMENTS: NORMAL

## 2025-06-30 ASSESSMENT — EXTERNAL EXAM - RIGHT EYE: OD_EXAM: NORMAL

## 2025-06-30 NOTE — LETTER
6/30/2025       RE: Volodymyr Valles  [address] [address]     Dear Colleague,    Thank you for referring your patient, Volodymyr Valles, to the [facility] at [facility]. Please see a copy of my visit note below.    Chief Complaint(s) and History of Present Illness(es)       Diplopia Evaluation    Associated symptoms include Negative for dizziness, muscle weakness and numbness.             Comments    Patient referred by Tisha Russ O.D. Patient first started noticing diplopia around the beginning of 2025 after he got new glasses. Saw Dr Izaguirre in may 2025. Reports that axis was wrong and they were remade, still noticing diplopia. Only notice it when looking down or has a chin up posture distance or near. Noticing diagonal diplopia. At onset of diplopia, he had double everywhere but has now become only in downgaze. Noticing a change in stereopsis. No new trauma to the head, illness or medications. No changes in VA.    PCP visit 6/13/25:  New problem requiring further workup.  Unclear etiology.  Relatively new onset of double vision when looking down.  No abnormal eye tracking noticed on neurological exam.  Pupils equal round reactive to light.  states his eye doctor did not notice any eye issues.  Will refer to neuro-ophthalmology.  Given cyst history will order MRI to make sure there has not been any acute change despite him having a stable scan 6 months ago.  ESR and CRP ordered for any autoimmune causes.  As some cranial nerve palsies (facial nerve etc.) can be due to Lyme's we will check a Lyme panel today.  - Adult Eye  Referral; Future  - LYME DISEASE TOTAL ANTIBODIES WITH REFLEX TO CONFIRMATION; Future  - CRP, inflammation; Future  - ESR: Erythrocyte sedimentation rate; Future  - MR Brain and Orbits w/o & w Contrast; Future - not scheduled yet.     Last brain surgery was in 2019 and has been noticing a  ache near scar recently. Monitors with Neurology annually with MRIs.  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    Comparison:  Brain MRI 12/4/2023 and 11/20/2022  Technique: Multiplanar, multisequence images of the brain without  contrast.     Findings: Continued slight increased size of portions of diffusion  restricting multifocal epidermoid cysts along the left medial middle  cranial fossa, left ambient cistern, left quadrigeminal cistern, left  cerebellopontine angle cistern and pontine mesencephalic cistern with  relatively stable mass effect on the left temporal lobe and mid brain.  For example the round shaped component lateral to left cerebellum  measures 13 mm in diameter, previously 11 mm, the larger component  along the lateral aspect of the cerebellar measures 5.4 cm on axial  plane, previously 5 cm. The component lateral to brainstem measures  3.3 x 3.8 cm on axial plane, previously 3.3 x 2.9 cm when measured in  the same fashion. The component in the left middle cranial fossa  measures 3.3 x 2.2 cm in axial plane, previously 3.1 x 2.3 cm.     Unchanged small subcortical T2 hyperintense focus in the right ventral  superior frontal gyrus. No hydrocephalus. No ventriculomegaly.  Unchanged T2 hyperintense signal in the left lateral cerebellum. No  new lesions or vasogenic edema in the brain parenchyma.     Clear paranasal sinuses/mastoid air cells. Normal orbits. Normal  marrow signal.                                                                      IMPRESSION: Continued slow growing size of multifocal residual  epidermoid cysts in the basal cisterns as detailed above. No  associated hydrocephalus. Grossly unchanged mass effect over the  brainstem.               Review of systems for the eyes was negative other than the pertinent positives and negatives noted in the HPI.     History is obtained from the patient.    Primary care: David Camacho   Referring provider: David Camacho  [place] is home  Assessment & Plan   Volodymyr Partha Valles is a 45 year old male who presents with:     Double vision  Left-sided fourth cranial nerve palsy  Myopic astigmatism of both eyes    Acute-onset diplopia in earlier this year (unclear exact timing, somewhere between January and March 2025), noticed after getting his new glasses. He complains of  binocular diagonal double vision that initially was noticeable in all gazes. Now is bothersome only in downgaze. Volodymyr has learned to close one eye intermittently to manage symptoms. He has a history of CNS epidermoid cysts that have slowly been growing in spite of two prior resections with the 12/17/24 MRI brain without contrast detailing these multifocal cysts with stable mass effect on the brainstem.     Today's exam confirms left superior oblique palsy and possible right superior oblique palsy. Currently the left superior oblique palsy is driving his symptoms with left hypertropia in downgaze and intermittently in right gaze.   - Reviewed with Volodymyr that pressure on the brainstem is likely the cause of these cranial nerve palsies. Recommend MRI brain and orbits with contrast to evaluate the cranial nerve pathways and mass effect from the cysts.   - We discussed the role of eye muscle surgery versus addressing the expected cause of his symptoms - his multifocal epidermoid cysts. The best approach will be determined by first understanding if these are indeed causing his symptoms, for which I have ordered the MRI as above. I am messaging Dr. Kebede to discuss weighing the risks vs benefits of each approach.  - Updated glasses prescription provided to sharpen vision.  - Declines any other diplopia management while working on more definitive management.        Return in about 2 months (around 8/30/2025) for Vision & alignment.    Patient  Instructions   Call [phone] to scheduling a MRI within 1 month.    It is important that you complete this imaging so we can provide the best care. If you have any concerns or need to delay the imaging for any reason please let Dr. Porter know by calling the clinic at [phone].    Dr. Porter will call with the results or review at your follow up appointment.         Visit Diagnoses & Orders    ICD-10-CM    1. Double vision  H53.2 Adult Eye  Referral     MR Brain and Orbits w/o & w Contrast      2. Left-sided fourth cranial nerve palsy  H49.12 Sensorimotor     MR Brain and Orbits w/o & w Contrast      3. Myopic astigmatism of both eyes  H52.203     H52.13          Attending Physician Attestation:  Complete documentation of historical and exam elements from today's encounter can be found in the full encounter summary report (not reduplicated in this progress note).  I personally obtained the chief complaint(s) and history of present illness.  I confirmed and edited as necessary the review of systems, past medical/surgical history, family history, social history, and examination findings as documented by others; and I examined the patient myself.  I personally reviewed the relevant tests, images, and reports as documented above.  I formulated and edited as necessary the assessment and plan and discussed the findings and management plan with the patient and family. - Sunni Porter MD              Again, thank you for allowing me to participate in the care of your patient.      Sincerely,    Sunni Porter MD

## 2025-06-30 NOTE — PROGRESS NOTES
Chief Complaint(s) and History of Present Illness(es)       Diplopia Evaluation    Associated symptoms include Negative for dizziness, muscle weakness and numbness.             Comments    Patient referred by Tisha Russ O.D. Patient first started noticing diplopia around the beginning of 2025 after he got new glasses. Saw Dr Izaguirre in may 2025. Reports that axis was wrong and they were remade, still noticing diplopia. Only notice it when looking down or has a chin up posture distance or near. Noticing diagonal diplopia. At onset of diplopia, he had double everywhere but has now become only in downgaze. Noticing a change in stereopsis. No new trauma to the head, illness or medications. No changes in VA.    PCP visit 6/13/25:  New problem requiring further workup.  Unclear etiology.  Relatively new onset of double vision when looking down.  No abnormal eye tracking noticed on neurological exam.  Pupils equal round reactive to light.  states his eye doctor did not notice any eye issues.  Will refer to neuro-ophthalmology.  Given cyst history will order MRI to make sure there has not been any acute change despite him having a stable scan 6 months ago.  ESR and CRP ordered for any autoimmune causes.  As some cranial nerve palsies (facial nerve etc.) can be due to Lyme's we will check a Lyme panel today.  - Adult Eye  Referral; Future  - LYME DISEASE TOTAL ANTIBODIES WITH REFLEX TO CONFIRMATION; Future  - CRP, inflammation; Future  - ESR: Erythrocyte sedimentation rate; Future  - MR Brain and Orbits w/o & w Contrast; Future - not scheduled yet.     Last brain surgery was in 2019 and has been noticing a ache near scar recently. Monitors with Neurology annually with MRIs.  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    Comparison:  Brain MRI 12/4/2023 and 11/20/2022  Technique: Multiplanar,  multisequence images of the brain without  contrast.     Findings: Continued slight increased size of portions of diffusion  restricting multifocal epidermoid cysts along the left medial middle  cranial fossa, left ambient cistern, left quadrigeminal cistern, left  cerebellopontine angle cistern and pontine mesencephalic cistern with  relatively stable mass effect on the left temporal lobe and mid brain.  For example the round shaped component lateral to left cerebellum  measures 13 mm in diameter, previously 11 mm, the larger component  along the lateral aspect of the cerebellar measures 5.4 cm on axial  plane, previously 5 cm. The component lateral to brainstem measures  3.3 x 3.8 cm on axial plane, previously 3.3 x 2.9 cm when measured in  the same fashion. The component in the left middle cranial fossa  measures 3.3 x 2.2 cm in axial plane, previously 3.1 x 2.3 cm.     Unchanged small subcortical T2 hyperintense focus in the right ventral  superior frontal gyrus. No hydrocephalus. No ventriculomegaly.  Unchanged T2 hyperintense signal in the left lateral cerebellum. No  new lesions or vasogenic edema in the brain parenchyma.     Clear paranasal sinuses/mastoid air cells. Normal orbits. Normal  marrow signal.                                                                      IMPRESSION: Continued slow growing size of multifocal residual  epidermoid cysts in the basal cisterns as detailed above. No  associated hydrocephalus. Grossly unchanged mass effect over the  brainstem.               Review of systems for the eyes was negative other than the pertinent positives and negatives noted in the HPI.    History is obtained from the patient.    Primary care: David Camacho   Referring provider: David Camacho  [place] is home  Assessment & Plan   Volodymyr Valles is a 45 year old male who presents with:     Double vision  Left-sided fourth cranial nerve palsy  Myopic astigmatism of both  eyes    Acute-onset diplopia in earlier this year (unclear exact timing, somewhere between January and March 2025), noticed after getting his new glasses. He complains of  binocular diagonal double vision that initially was noticeable in all gazes. Now is bothersome only in downgaze. Volodymyr has learned to close one eye intermittently to manage symptoms. He has a history of CNS epidermoid cysts that have slowly been growing in spite of two prior resections with the 12/17/24 MRI brain without contrast detailing these multifocal cysts with stable mass effect on the brainstem.     Today's exam confirms left superior oblique palsy and possible right superior oblique palsy. Currently the left superior oblique palsy is driving his symptoms with left hypertropia in downgaze and intermittently in right gaze.   - Reviewed with Volodymyr that pressure on the brainstem is likely the cause of these cranial nerve palsies. Recommend MRI brain and orbits with contrast to evaluate the cranial nerve pathways and mass effect from the cysts.   - We discussed the role of eye muscle surgery versus addressing the expected cause of his symptoms - his multifocal epidermoid cysts. The best approach will be determined by first understanding if these are indeed causing his symptoms, for which I have ordered the MRI as above. I am messaging Dr. Kebede to discuss weighing the risks vs benefits of each approach.  - Updated glasses prescription provided to sharpen vision.  - Declines any other diplopia management while working on more definitive management.        Return in about 2 months (around 8/30/2025) for Vision & alignment.    Patient Instructions   Call [phone] to scheduling a MRI within 1 month.    It is important that you complete this imaging so we can provide the best care. If you have any concerns or need to delay the imaging for any reason please let Dr. Porter know by calling the clinic at [phone].    Dr. Porter will call with  the results or review at your follow up appointment.         Visit Diagnoses & Orders    ICD-10-CM    1. Double vision  H53.2 Adult Eye  Referral     MR Brain and Orbits w/o & w Contrast      2. Left-sided fourth cranial nerve palsy  H49.12 Sensorimotor     MR Brain and Orbits w/o & w Contrast      3. Myopic astigmatism of both eyes  H52.203     H52.13          Attending Physician Attestation:  Complete documentation of historical and exam elements from today's encounter can be found in the full encounter summary report (not reduplicated in this progress note).  I personally obtained the chief complaint(s) and history of present illness.  I confirmed and edited as necessary the review of systems, past medical/surgical history, family history, social history, and examination findings as documented by others; and I examined the patient myself.  I personally reviewed the relevant tests, images, and reports as documented above.  I formulated and edited as necessary the assessment and plan and discussed the findings and management plan with the patient and family. - Sunni Porter MD

## 2025-07-31 ENCOUNTER — ANCILLARY PROCEDURE (OUTPATIENT)
Dept: MRI IMAGING | Facility: CLINIC | Age: 45
End: 2025-07-31
Attending: OPHTHALMOLOGY
Payer: COMMERCIAL

## 2025-07-31 DIAGNOSIS — H49.12 LEFT-SIDED FOURTH CRANIAL NERVE PALSY: ICD-10-CM

## 2025-07-31 DIAGNOSIS — H53.2 DOUBLE VISION: ICD-10-CM

## 2025-07-31 PROCEDURE — 70553 MRI BRAIN STEM W/O & W/DYE: CPT | Performed by: RADIOLOGY

## 2025-07-31 PROCEDURE — A9585 GADOBUTROL INJECTION: HCPCS | Mod: JZ | Performed by: RADIOLOGY

## 2025-07-31 PROCEDURE — 70543 MRI ORBT/FAC/NCK W/O &W/DYE: CPT | Performed by: RADIOLOGY

## 2025-07-31 RX ORDER — GADOBUTROL 604.72 MG/ML
10 INJECTION INTRAVENOUS ONCE
Status: COMPLETED | OUTPATIENT
Start: 2025-07-31 | End: 2025-07-31

## 2025-07-31 RX ADMIN — GADOBUTROL 10 ML: 604.72 INJECTION INTRAVENOUS at 13:01

## (undated) DEVICE — PREP CHLORAPREP ORANGE 3ML  260415

## (undated) DEVICE — CRANIOTOME ADULT ANSPACH A-CRN

## (undated) DEVICE — PAD CHUX UNDERPAD 23X24" 7136

## (undated) DEVICE — ESU ELEC BLADE 2.75" COATED/INSULATED E1455

## (undated) DEVICE — SOL RINGERS LACTATED 1000ML BAG 2B2324X

## (undated) DEVICE — WIPES FOLEY CARE SURESTEP PROVON DFC100

## (undated) DEVICE — SPONGE COTTONOID 1/2X1/2" 20-04S

## (undated) DEVICE — DRAPE CRANIOTOMY W/POUCH 9450

## (undated) DEVICE — SURGICEL HEMOSTAT 4X8" 1952

## (undated) DEVICE — ESU GROUND PAD ADULT W/CORD E7507

## (undated) DEVICE — SYR 30ML LL W/O NDL 302832

## (undated) DEVICE — PACKING NUGAUZE 1/4" PLAIN 7631

## (undated) DEVICE — SU NUROLON 4-0 TF CR 8X18" C584D

## (undated) DEVICE — ENDO TROCAR BALLOON TIP 10MM  OMS-T10SB

## (undated) DEVICE — GLOVE PROTEXIS BLUE W/NEU-THERA 6.5  2D73EB65

## (undated) DEVICE — IOM MONITORING FIRST 7 HOURS ---- 15 MIN

## (undated) DEVICE — GLOVE PROTEXIS MICRO 6.0  2D73PM60

## (undated) DEVICE — ESU CORD BIPOLAR AND IRR TUBING AESCULAP US355

## (undated) DEVICE — IMP PLATE MESH SYN CONTOUR 1.5X100X100MM MAL TI 446.017: Type: IMPLANTABLE DEVICE | Site: BRAIN | Status: NON-FUNCTIONAL

## (undated) DEVICE — SOL RINGERS LACTATED 1000ML BAG 07953-09

## (undated) DEVICE — RETR ELASTIC STAYS LONE STAR BLUNT DUAL LEAD 3550-1G

## (undated) DEVICE — BUR BALL 5MM CARBIDE ANSPACH S-5B-C-G1

## (undated) DEVICE — CATH TRAY FOLEY SURESTEP 16FR W/TMP PRB STLK LATEX A319416AM

## (undated) DEVICE — SOL WATER IRRIG 1000ML BOTTLE 2F7114

## (undated) DEVICE — SUCTION MANIFOLD DORNOCH ULTRA CART UL-CL500

## (undated) DEVICE — SPONGE SURGIFOAM 01GM POWDER 1978

## (undated) DEVICE — ESU HOLDER LAP INST DISP PURPLE LONG 330MM H-PRO-330

## (undated) DEVICE — SOL NACL 0.9% IRRIG 1000ML BOTTLE 07138-09

## (undated) DEVICE — DECANTER BAG 2002S

## (undated) DEVICE — SU VICRYL 2-0 CT-2 CR 8X18" J726D

## (undated) DEVICE — DRSG BANDAID 3/4X3"

## (undated) DEVICE — LABEL MEDICATION SYSTEM 3303-P

## (undated) DEVICE — DRSG STERI STRIP 1/2X4" R1547

## (undated) DEVICE — SPONGE SURGIFOAM 100 1974

## (undated) DEVICE — SPONGE COTTONOID 1/2X1 1/2" 20-06S

## (undated) DEVICE — SYR 10ML LL W/O NDL 302995

## (undated) DEVICE — ESU CORD MONOPOLAR 10'  E0510

## (undated) DEVICE — SPONGE COTTONOID 1X3" 20-10S

## (undated) DEVICE — SU VICRYL 3-0 SH 27" J316H

## (undated) DEVICE — IOM SUPPLIES

## (undated) DEVICE — SU ETHILON 3-0 PS-1 18" 1663H

## (undated) DEVICE — PACK CRANIOTOMY

## (undated) DEVICE — PREP CHLORAPREP 26ML TINTED ORANGE  260815

## (undated) DEVICE — BLADE CLIPPER 4406

## (undated) DEVICE — LINEN TOWEL PACK X5 5464

## (undated) DEVICE — DRAPE POUCH INSTRUMENT 1018

## (undated) DEVICE — DRAPE SHEET MED 44X70" 9355

## (undated) DEVICE — SU VICRYL 0 UR-6 27" J603H

## (undated) DEVICE — BUR ROUTER 1.4X12.8MM ANSPACH S-1R

## (undated) DEVICE — SPONGE RAY-TEC 4X8" 7318

## (undated) DEVICE — KIT ENDO FIRST STEP DISINFECTANT 200ML W/POUCH EP-4

## (undated) DEVICE — SPONGE COTTONOID 1/2X3" 20-07S

## (undated) DEVICE — BLADE KNIFE BEAVER MICROSHARP GREEN 377515

## (undated) DEVICE — PIN SKULL MAYFIELD ADULT TITANIUM 3/PK A1120

## (undated) DEVICE — DRSG GAUZE 4X4" 3033

## (undated) DEVICE — SYR 10ML FINGER CONTROL W/O NDL 309695

## (undated) DEVICE — PACK LAP CHOLE SLC15LCFSD

## (undated) DEVICE — DRAIN JACKSON PRATT 10MM FLAT 4/4 PERF SU130-1311

## (undated) DEVICE — ENDO TROCAR DISSECTING BALLOON OMS-PDBS2

## (undated) DEVICE — DRAPE MAYO STAND 23X54 8337

## (undated) DEVICE — DRAPE LAP W/ARMBOARD 29410

## (undated) DEVICE — PERFORATOR 14MM CODMAN

## (undated) DEVICE — SU VICRYL 4-0 PS-2 18" UND J496H

## (undated) DEVICE — IOM MONITORING ---- 15 MIN

## (undated) DEVICE — DRAIN JACKSON PRATT RESERVOIR 100ML SU130-1305

## (undated) DEVICE — RX BACITRACIN OINTMENT 0.9G 1/32OZ CUR001109

## (undated) DEVICE — ESU PENCIL W/HOLSTER E2350H

## (undated) DEVICE — DRAPE MICROSCOPE LEICA 54X150" AR8033650

## (undated) DEVICE — NDL ANGIOCATH 14GA 1.25" 4048

## (undated) DEVICE — NDL BLUNT 18GA 1" W/O FILTER 305181

## (undated) DEVICE — ENDO TROCAR 05.5MM PEDI 24055

## (undated) DEVICE — DRAPE SHEET REV FOLD 3/4 9349

## (undated) DEVICE — SU VICRYL 0 ENDOLOOP 18" EJ10

## (undated) RX ORDER — PROPOFOL 10 MG/ML
INJECTION, EMULSION INTRAVENOUS
Status: DISPENSED
Start: 2017-03-13

## (undated) RX ORDER — LIDOCAINE HYDROCHLORIDE 20 MG/ML
INJECTION, SOLUTION EPIDURAL; INFILTRATION; INTRACAUDAL; PERINEURAL
Status: DISPENSED
Start: 2019-10-07

## (undated) RX ORDER — DEXAMETHASONE SODIUM PHOSPHATE 4 MG/ML
INJECTION, SOLUTION INTRA-ARTICULAR; INTRALESIONAL; INTRAMUSCULAR; INTRAVENOUS; SOFT TISSUE
Status: DISPENSED
Start: 2019-10-07

## (undated) RX ORDER — EPHEDRINE SULFATE 50 MG/ML
INJECTION, SOLUTION INTRAMUSCULAR; INTRAVENOUS; SUBCUTANEOUS
Status: DISPENSED
Start: 2019-10-07

## (undated) RX ORDER — FENTANYL CITRATE 50 UG/ML
INJECTION, SOLUTION INTRAMUSCULAR; INTRAVENOUS
Status: DISPENSED
Start: 2017-03-13

## (undated) RX ORDER — ONDANSETRON 2 MG/ML
INJECTION INTRAMUSCULAR; INTRAVENOUS
Status: DISPENSED
Start: 2019-10-07

## (undated) RX ORDER — LABETALOL 20 MG/4 ML (5 MG/ML) INTRAVENOUS SYRINGE
Status: DISPENSED
Start: 2019-10-07

## (undated) RX ORDER — FENTANYL CITRATE 50 UG/ML
INJECTION, SOLUTION INTRAMUSCULAR; INTRAVENOUS
Status: DISPENSED
Start: 2024-01-09

## (undated) RX ORDER — BUPIVACAINE HYDROCHLORIDE AND EPINEPHRINE 2.5; 5 MG/ML; UG/ML
INJECTION, SOLUTION EPIDURAL; INFILTRATION; INTRACAUDAL; PERINEURAL
Status: DISPENSED
Start: 2019-10-07

## (undated) RX ORDER — GABAPENTIN 300 MG/1
CAPSULE ORAL
Status: DISPENSED
Start: 2019-10-07

## (undated) RX ORDER — LABETALOL HYDROCHLORIDE 5 MG/ML
INJECTION, SOLUTION INTRAVENOUS
Status: DISPENSED
Start: 2019-10-07

## (undated) RX ORDER — DEXAMETHASONE SODIUM PHOSPHATE 4 MG/ML
INJECTION, SOLUTION INTRA-ARTICULAR; INTRALESIONAL; INTRAMUSCULAR; INTRAVENOUS; SOFT TISSUE
Status: DISPENSED
Start: 2017-03-13

## (undated) RX ORDER — OXYCODONE AND ACETAMINOPHEN 5; 325 MG/1; MG/1
TABLET ORAL
Status: DISPENSED
Start: 2017-03-13

## (undated) RX ORDER — CEFAZOLIN SODIUM 2 G/100ML
INJECTION, SOLUTION INTRAVENOUS
Status: DISPENSED
Start: 2017-03-13

## (undated) RX ORDER — LIDOCAINE HYDROCHLORIDE 20 MG/ML
INJECTION, SOLUTION EPIDURAL; INFILTRATION; INTRACAUDAL; PERINEURAL
Status: DISPENSED
Start: 2017-03-13

## (undated) RX ORDER — FENTANYL CITRATE 50 UG/ML
INJECTION, SOLUTION INTRAMUSCULAR; INTRAVENOUS
Status: DISPENSED
Start: 2019-10-07

## (undated) RX ORDER — PHENYLEPHRINE HCL IN 0.9% NACL 1 MG/10 ML
SYRINGE (ML) INTRAVENOUS
Status: DISPENSED
Start: 2019-10-07

## (undated) RX ORDER — ESMOLOL HYDROCHLORIDE 10 MG/ML
INJECTION INTRAVENOUS
Status: DISPENSED
Start: 2019-10-07

## (undated) RX ORDER — BACITRACIN 50000 [IU]/1
INJECTION, POWDER, FOR SOLUTION INTRAMUSCULAR
Status: DISPENSED
Start: 2019-10-07

## (undated) RX ORDER — PROPOFOL 10 MG/ML
INJECTION, EMULSION INTRAVENOUS
Status: DISPENSED
Start: 2019-10-07

## (undated) RX ORDER — GLYCOPYRROLATE 0.2 MG/ML
INJECTION, SOLUTION INTRAMUSCULAR; INTRAVENOUS
Status: DISPENSED
Start: 2019-10-07

## (undated) RX ORDER — CEFAZOLIN SODIUM 2 G/100ML
INJECTION, SOLUTION INTRAVENOUS
Status: DISPENSED
Start: 2019-10-07

## (undated) RX ORDER — ACETAMINOPHEN 325 MG/1
TABLET ORAL
Status: DISPENSED
Start: 2019-10-07

## (undated) RX ORDER — GLYCOPYRROLATE 0.2 MG/ML
INJECTION, SOLUTION INTRAMUSCULAR; INTRAVENOUS
Status: DISPENSED
Start: 2017-03-13

## (undated) RX ORDER — ONDANSETRON 2 MG/ML
INJECTION INTRAMUSCULAR; INTRAVENOUS
Status: DISPENSED
Start: 2017-03-13

## (undated) RX ORDER — CEFAZOLIN SODIUM 1 G/3ML
INJECTION, POWDER, FOR SOLUTION INTRAMUSCULAR; INTRAVENOUS
Status: DISPENSED
Start: 2019-10-07

## (undated) RX ORDER — SODIUM CHLORIDE 9 MG/ML
INJECTION, SOLUTION INTRAVENOUS
Status: DISPENSED
Start: 2019-10-07